# Patient Record
Sex: FEMALE | Race: WHITE
[De-identification: names, ages, dates, MRNs, and addresses within clinical notes are randomized per-mention and may not be internally consistent; named-entity substitution may affect disease eponyms.]

---

## 2017-05-06 ENCOUNTER — HOSPITAL ENCOUNTER (EMERGENCY)
Dept: HOSPITAL 33 - ED | Age: 52
Discharge: HOME | End: 2017-05-06
Payer: COMMERCIAL

## 2017-05-06 VITALS — HEART RATE: 76 BPM | SYSTOLIC BLOOD PRESSURE: 127 MMHG | OXYGEN SATURATION: 100 % | DIASTOLIC BLOOD PRESSURE: 70 MMHG

## 2017-05-06 DIAGNOSIS — R11.0: ICD-10-CM

## 2017-05-06 DIAGNOSIS — R19.7: ICD-10-CM

## 2017-05-06 DIAGNOSIS — K52.9: Primary | ICD-10-CM

## 2017-05-06 DIAGNOSIS — R10.9: ICD-10-CM

## 2017-05-06 LAB
ALBUMIN SERPL-MCNC: 3.8 G/DL (ref 3.4–5)
ALP SERPL-CCNC: 103 U/L (ref 46–116)
ALT SERPL-CCNC: 29 U/L (ref 12–78)
ANION GAP SERPL CALC-SCNC: 15.2 MEQ/L (ref 5–15)
AST SERPL QL: 37 U/L (ref 15–37)
BASOPHILS NFR BLD AUTO: 0.2 % (ref 0–0.4)
BILIRUB BLD-MCNC: 0.6 MG/DL (ref 0.2–1)
BUN SERPL-MCNC: 20 MG/DL (ref 9–20)
CHLORIDE SERPL-SCNC: 101 MEQ/L (ref 98–107)
CO2 SERPL-SCNC: 27.6 MEQ/L (ref 21–32)
COLLECTION TYPE: (no result)
COMPLETE URINE MICROSCOPIC?: YES
GLUCOSE SERPL-MCNC: 97 MG/DL (ref 70–110)
MCH RBC QN AUTO: 29.3 PG (ref 26–32)
NEUTROPHILS NFR BLD AUTO: 73.4 % (ref 36–66)
PLATELET # BLD AUTO: 260 K/MM3 (ref 150–450)
POTASSIUM SERPLBLD-SCNC: 3.8 MEQ/L (ref 3.5–5.1)
PROT SERPL-MCNC: 8.1 GM/DL (ref 6.4–8.2)
RBC # BLD AUTO: 4.75 M/MM3 (ref 4.1–5.4)
SODIUM SERPL-SCNC: 140 MEQ/L (ref 136–145)
WBC # BLD AUTO: 10.9 K/MM3 (ref 4–10.5)
WBC URNS QL MICRO: (no result) /HPF (ref 0–5)

## 2017-05-06 PROCEDURE — 36000 PLACE NEEDLE IN VEIN: CPT

## 2017-05-06 PROCEDURE — 99283 EMERGENCY DEPT VISIT LOW MDM: CPT

## 2017-05-06 PROCEDURE — 96360 HYDRATION IV INFUSION INIT: CPT

## 2017-05-06 PROCEDURE — 83690 ASSAY OF LIPASE: CPT

## 2017-05-06 PROCEDURE — 80053 COMPREHEN METABOLIC PANEL: CPT

## 2017-05-06 PROCEDURE — 99284 EMERGENCY DEPT VISIT MOD MDM: CPT

## 2017-05-06 PROCEDURE — 85025 COMPLETE CBC W/AUTO DIFF WBC: CPT

## 2017-05-06 PROCEDURE — 36415 COLL VENOUS BLD VENIPUNCTURE: CPT

## 2017-05-06 PROCEDURE — 81000 URINALYSIS NONAUTO W/SCOPE: CPT

## 2017-05-06 PROCEDURE — 96374 THER/PROPH/DIAG INJ IV PUSH: CPT

## 2017-05-06 PROCEDURE — 74000: CPT

## 2017-05-06 NOTE — ERPHSYRPT
- History of Present Illness


Time Seen by Provider: 05/06/17 20:40


Historian: patient


Exam Limitations: no limitations


Patient Subjective Stated Complaint: pt states she has been having nausea, 

diarrhea, abd cramping and generalized aches since yesterday at 0300


Triage Nursing Assessment: pt alert and oriented. answers questions approp. 

skin pink warm and dry. respirations nonlabored wilth lungs cta. abd soft and 

nontender to light palpation. bowel sounds present and hyper. pt states she has 

been having approx 1-2 diarrhea stools per hour and states she has been passing 

flatus.


Physician History: 





The patient is a 51-year-old female who is morbidly obese complains of nausea 

and diarrhea since early yesterday morning.  She's had approximately 2-3 loose 

stools yesterday and then again today.  She has taken very little fluids 

because of the nausea.  She did eat a popsicle.  She had some Phenergan from a 

previous illness, took it, and felt better.  That was the only when she had.  

She also aches all over.  Her past medical history significant for hypertension

, high cholesterol, renal failure, and asthma.


Timing/Duration: yesterday


Activities at Onset: none


Quality: cramping


Abdominal Pain Onset Location: generalized abdomen


Pain Radiation: no radiation


Severity of Pain-Max: mild


Severity of Pain-Current: mild


Modifying Factors: Improves With: nothing


Associated Symptoms: diarrhea, nausea


Previous symptoms: no prior history


Allergies/Adverse Reactions: 








Penicillins Allergy (Verified 05/06/17 20:23)


 


sulfamethoxazole [From Bactrim] Allergy (Verified 05/06/17 20:23)


 


trimethoprim [From Bactrim] Allergy (Verified 05/06/17 20:23)


 





Home Medications: 








Aspirin 81 gm Chew*** [Baby Aspirin 81 mg Chew***] 81 mg PO DAILY 11/22/13 [

History]


Clonazepam [Klonopin] 2 mg PO TID PRN 11/22/13 [History]


Hydrocodone Bit/Acetaminophen [Norco 7.5-325 Tablet] 1 tab PO Q6H 07/12/16 [

History]


Chlorthalidone 25 mg PO DAILY 05/06/17 [History]


Levothyroxine Sodium 112 Mcg** [Synthroid 112 Mcg***] 112 mcg PO DAILY 05/06/17 

[History]


Spironolactone 25 mg*** [Aldactone 25 MG***] 25 mg PO DAILY 05/06/17 [History]





Hx Tetanus, Diphtheria Vaccination/Date Given: Yes


Hx Influenza Vaccination/Date Given: No


Hx Pneumococcal Vaccination/Date Given: No


Immunizations Up to Date: Yes





- Review of Systems


Constitutional: No Symptoms (.  Busy schedule)


Eyes: No Symptoms


Ears, Nose, & Throat: No Symptoms


Respiratory: No Cough, No Dyspnea


Cardiac: No Chest Pain, No Edema, No Syncope


Abdominal/Gastrointestinal: Abdominal Pain, Nausea, Diarrhea


Genitourinary Symptoms: No Dysuria


Musculoskeletal: Arthralgias


Skin: No Rash


Neurological: No Dizziness, No Focal Weakness, No Sensory Changes


Psychological: No Symptoms


Endocrine: No Symptoms


Hematologic/Lymphatic: No Symptoms


Immunological/Allergic: No Symptoms


All Other Systems: Reviewed and Negative





- Past Medical History


Pertinent Past Medical History: Yes


Neurological History: No Pertinent History


ENT History: No Pertinent History


Cardiac History: No Pertinent History


Respiratory History: Asthma


Endocrine Medical History: No Pertinent History


Musculoskeletal History: No Pertinent History


GI Medical History: No Pertinent History


 History: Renal Disease


Psycho-Social History: Depression


Female Reproductive Disorders: No Pertinent History


Other Medical History: hx of acute renal failure. pt states function in wnl now





- Past Surgical History


Past Surgical History: Yes


Neuro Surgical History: No Pertinent History


Cardiac: No Pertinent History


Respiratory: No Pertinent History


Gastrointestinal: Appendectomy


Genitourinary: No Pertinent History


Musculoskeletal: Orthopedic Surgery


Female Surgical History: Hysterectomy


Other Surgical History: right knee





- Social History


Smoking Status: Never smoker


Exposure to second hand smoke: No


Drug Use: none


Patient Lives Alone: No





- Nursing Vital Signs


Nursing Vital Signs: 


 Initial Vital Signs











Temperature                    97.8 F


 


Temperature Source             Core


 


Pulse Rate                     70


 


Respiratory Rate               18


 


Blood Pressure [Right Arm]     132/70


 


Pain Intensity                 0

















- Physical Exam


General Appearance: no apparent distress, alert


Eye Exam: PERRL/EOMI, eyes nml inspection


Ears, Nose, Throat Exam: normal ENT inspection, pharynx normal, moist mucous 

membranes


Neck Exam: normal inspection, non-tender, supple, full range of motion


Respiratory Exam: normal breath sounds, lungs clear, No respiratory distress


Cardiovascular Exam: regular rate/rhythm


Gastrointestinal/Abdomen Exam: tenderness


Pelvic Exam: not done


Rectal Exam: not done


Back Exam: normal inspection, normal range of motion, No CVA tenderness, No 

vertebral tenderness


Extremity Exam: normal inspection, normal range of motion, pelvis stable


Neurologic Exam: alert, oriented x 3, cooperative, normal mood/affect, nml 

cerebellar function, sensation nml, No motor deficits


SpO2: 98


Oxygen Delivery: Room Air





- Radiology Exams


  ** Abdomen


X-ray Interpretation: Interpreted by me, Other (increased colonic fecal load)


Ordered Tests: 


 Active Orders 24 hr











 Category Date Time Status


 


 IV Insertion STAT Care  05/06/17 20:55 Active


 


 KUB Stat Exams  05/06/17 20:55 Taken


 


 CBC W DIFF Stat Lab  05/06/17 21:30 Completed


 


 CMP Stat Lab  05/06/17 21:30 Completed


 


 LIPASE Stat Lab  05/06/17 21:43 Completed


 


 UA W/ MICROSCOPIC Stat Lab  05/06/17 21:48 Completed








Medication Summary














Discontinued Medications














Generic Name Dose Route Start Last Admin





  Trade Name Freq  PRN Reason Stop Dose Admin


 


Sodium Chloride  1,000 mls @ 999 mls/hr  05/06/17 20:55  05/06/17 21:02





  Sodium Chloride 0.9% 1000 Ml  IV  05/06/17 21:55  999 mls/hr





  .Q1H1M STA   Administration


 


Sodium Chloride  Confirm  05/06/17 21:01  





  Sodium Chloride 0.9% 1000 Ml  Administered  05/06/17 21:02  





  Dose   





  1,000 mls @ ud   





  .ROUTE   





  .STK-MED ONE   


 


Promethazine HCl  12.5 mg  05/06/17 20:55  05/06/17 21:02





  Phenergan 25 Mg Inj***  IV  05/06/17 20:56  12.5 mg





  STAT ONE   Administration


 


Promethazine HCl  Confirm  05/06/17 21:01  





  Phenergan 25 Mg Inj***  Administered  05/06/17 21:02  





  Dose   





  25 mg   





  .ROUTE   





  .STK-MED ONE   











Lab/Rad Data: 


 Laboratory Result Diagrams





 05/06/17 21:30 





 05/06/17 21:30 





 Laboratory Results











  05/06/17 05/06/17 05/06/17 Range/Units





  21:48 21:43 21:30 


 


WBC     (4.0-10.5)  K/mm3


 


RBC     (4.1-5.4)  M/mm3


 


Hgb     (12.0-16.0)  gm/dl


 


Hct     (35-47)  %


 


MCV     ()  fl


 


MCH     (26-32)  pg


 


MCHC     (32-36)  g/dl


 


RDW     (11.5-14.0)  %


 


Plt Count     (150-450)  K/mm3


 


MPV     (6-9.5)  fl


 


Gran %     (36.0-66.0)  %


 


Lymphocytes %     (24.0-44.0)  %


 


Monocytes %     (0.0-12.0)  %


 


Eosinophils %     (0.00-5.0)  %


 


Basophils %     (0.0-0.4)  %


 


Basophils #     (0-0.4)  


 


Sodium    140  (136-145)  mEq/L


 


Potassium    3.8  (3.5-5.1)  mEq/L


 


Chloride    101  ()  mEq/L


 


Carbon Dioxide    27.6  (21-32)  mEq/L


 


Anion Gap    15.2 H  (5-15)  MEQ/L


 


BUN    20  (9-20)  mg/dL


 


Creatinine    1.53 H  (0.55-1.30)  mg/dl


 


Estimated GFR    38  ML/MIN


 


Glucose    97  ()  MG/DL


 


Calcium    9.4  (8.5-10.1)  mg/dL


 


Total Bilirubin    0.6  (0.2-1.0)  mg/dL


 


AST    37  (15-37)  U/L


 


ALT    29  (12-78)  U/L


 


Alkaline Phosphatase    103  ()  U/L


 


Serum Total Protein    8.1  (6.4-8.2)  gm/dL


 


Albumin    3.8  (3.4-5.0)  g/dL


 


Lipase   224   ()  U/L


 


Ur Collection Type  CLEAN CATCH    


 


Urine Color  YELLOW    (YELLOW)  


 


Urine Appearance  CLEAR    (CLEAR)  


 


Urine pH  7.0    (5-6)  


 


Ur Specific Gravity  1.010    (1.005-1.025)  


 


Urine Protein  NEGATIVE    (Negative)  


 


Urine Glucose (UA)  NEGATIVE    (NEGATIVE)  mg/dL


 


Urine Ketones  NEGATIVE    (NEGATIVE)  


 


Urine Nitrite  NEGATIVE    (NEGATIVE)  


 


Urine Bilirubin  NEGATIVE    (NEGATIVE)  


 


Urine Urobilinogen  0.2    (0-1)  mg/dL


 


Urine WBC (Auto)  TRACE    (NEGATIVE)  


 


Urine RBC (Auto)  NEGATIVE    (0-5)  Dougie/ul


 


Urine Microscopic RBC  2-5    (0-2)  /HPF


 


Urine Microscopic WBC  2-5    (0-5)  /HPF


 


Ur Epithelial Cells  FEW    (FEW)  /HPF


 


Urine Bacteria  FEW    (NEGATIVE)  /HPF


 


Specimen Received  5/6/17 2145    














  05/06/17 Range/Units





  21:30 


 


WBC  10.9 H  (4.0-10.5)  K/mm3


 


RBC  4.75  (4.1-5.4)  M/mm3


 


Hgb  13.9  (12.0-16.0)  gm/dl


 


Hct  43.0  (35-47)  %


 


MCV  90.5  ()  fl


 


MCH  29.3  (26-32)  pg


 


MCHC  32.3  (32-36)  g/dl


 


RDW  14.2 H  (11.5-14.0)  %


 


Plt Count  260  (150-450)  K/mm3


 


MPV  9.2  (6-9.5)  fl


 


Gran %  73.4 H  (36.0-66.0)  %


 


Lymphocytes %  18.4 L  (24.0-44.0)  %


 


Monocytes %  6.5  (0.0-12.0)  %


 


Eosinophils %  1.5  (0.00-5.0)  %


 


Basophils %  0.2  (0.0-0.4)  %


 


Basophils #  0.02  (0-0.4)  


 


Sodium   (136-145)  mEq/L


 


Potassium   (3.5-5.1)  mEq/L


 


Chloride   ()  mEq/L


 


Carbon Dioxide   (21-32)  mEq/L


 


Anion Gap   (5-15)  MEQ/L


 


BUN   (9-20)  mg/dL


 


Creatinine   (0.55-1.30)  mg/dl


 


Estimated GFR   ML/MIN


 


Glucose   ()  MG/DL


 


Calcium   (8.5-10.1)  mg/dL


 


Total Bilirubin   (0.2-1.0)  mg/dL


 


AST   (15-37)  U/L


 


ALT   (12-78)  U/L


 


Alkaline Phosphatase   ()  U/L


 


Serum Total Protein   (6.4-8.2)  gm/dL


 


Albumin   (3.4-5.0)  g/dL


 


Lipase   ()  U/L


 


Ur Collection Type   


 


Urine Color   (YELLOW)  


 


Urine Appearance   (CLEAR)  


 


Urine pH   (5-6)  


 


Ur Specific Gravity   (1.005-1.025)  


 


Urine Protein   (Negative)  


 


Urine Glucose (UA)   (NEGATIVE)  mg/dL


 


Urine Ketones   (NEGATIVE)  


 


Urine Nitrite   (NEGATIVE)  


 


Urine Bilirubin   (NEGATIVE)  


 


Urine Urobilinogen   (0-1)  mg/dL


 


Urine WBC (Auto)   (NEGATIVE)  


 


Urine RBC (Auto)   (0-5)  Dougie/ul


 


Urine Microscopic RBC   (0-2)  /HPF


 


Urine Microscopic WBC   (0-5)  /HPF


 


Ur Epithelial Cells   (FEW)  /HPF


 


Urine Bacteria   (NEGATIVE)  /HPF


 


Specimen Received   














- Progress


Progress: improved


Counseled pt/family regarding: lab results, diagnosis, rad results





- Departure


Time of Disposition: 22:38


Departure Disposition: Home


Clinical Impression: 


 Gastroenteritis





Condition: Stable


Critical Care Time: No


Additional Instructions: 


You have gastroenteritis.  In the ER you were given IV fluids and Phenergan 

12.5 mg by IV.  Begin with a clear liquid diet and advance as tolerated.  You 

were given a prescription for Phenergan 25 mg to be taken every 8 hours as 

needed for nausea.  Follow-up as needed.


Prescriptions: 


Promethazine HCl 25 mg*** [Phenergan 25 mg***] 25 mg PO Q8H PRN PRN #10 tablet


 PRN Reason: Nausea

## 2017-05-07 NOTE — XRAY
Indication: Abdominal pain and diarrhea.



Comparison: December 6, 2007.



KUB again nonacute and nonobstructed with right pelvic phlebolith.  Solid

organs unremarkable.  Osseous structures intact again with mild osteopenia and

degenerative changes.  Lung bases clear.



Impression: Negative KUB.

## 2018-03-29 ENCOUNTER — HOSPITAL ENCOUNTER (EMERGENCY)
Dept: HOSPITAL 33 - ED | Age: 53
Discharge: HOME | End: 2018-03-29
Payer: COMMERCIAL

## 2018-03-29 VITALS — SYSTOLIC BLOOD PRESSURE: 132 MMHG | HEART RATE: 98 BPM | DIASTOLIC BLOOD PRESSURE: 76 MMHG | OXYGEN SATURATION: 99 %

## 2018-03-29 DIAGNOSIS — K62.9: Primary | ICD-10-CM

## 2018-03-29 DIAGNOSIS — E87.6: ICD-10-CM

## 2018-03-29 DIAGNOSIS — E86.0: ICD-10-CM

## 2018-03-29 DIAGNOSIS — E03.9: ICD-10-CM

## 2018-03-29 DIAGNOSIS — F41.9: ICD-10-CM

## 2018-03-29 DIAGNOSIS — I10: ICD-10-CM

## 2018-03-29 DIAGNOSIS — Z79.899: ICD-10-CM

## 2018-03-29 LAB
ALBUMIN SERPL-MCNC: 4.5 G/DL (ref 3.5–5)
ALP SERPL-CCNC: 103 U/L (ref 38–126)
ALT SERPL-CCNC: 28 U/L (ref 0–35)
ANION GAP SERPL CALC-SCNC: 16.9 MEQ/L (ref 5–15)
AST SERPL QL: 32 U/L (ref 14–36)
BASOPHILS # BLD AUTO: 0 10*3/UL (ref 0–0.4)
BASOPHILS NFR BLD AUTO: 0 % (ref 0–0.4)
BILIRUB BLD-MCNC: 0.8 MG/DL (ref 0.2–1.3)
BUN SERPL-MCNC: 34 MG/DL (ref 7–17)
CALCIUM SPEC-MCNC: 9.4 MG/DL (ref 8.4–10.2)
CHLORIDE SERPL-SCNC: 94 MMOL/L (ref 98–107)
CO2 SERPL-SCNC: 34 MMOL/L (ref 22–30)
CREAT SERPL-MCNC: 1.59 MG/DL (ref 0.52–1.04)
EOSINOPHIL # BLD AUTO: 0.06 10*3/UL (ref 0–0.5)
GLUCOSE SERPL-MCNC: 146 MG/DL (ref 74–106)
GLUCOSE UR-MCNC: NEGATIVE MG/DL
GRANULOCYTES # BLD AUTO: 12.69 10*3/UL (ref 1.4–6.9)
HCT VFR BLD AUTO: 46.9 % (ref 35–47)
HGB BLD-MCNC: 15 GM/DL (ref 12–16)
LIPASE SERPL-CCNC: 136 U/L (ref 23–300)
LYMPHOCYTES # SPEC AUTO: 0.8 10*3/UL (ref 1–4.6)
MCH RBC QN AUTO: 29.5 PG (ref 26–32)
MCHC RBC AUTO-ENTMCNC: 32 G/DL (ref 32–36)
MONOCYTES # BLD AUTO: 0.63 10*3/UL (ref 0–1.3)
NEUTROPHILS NFR BLD AUTO: 89.6 % (ref 36–66)
PLATELET # BLD AUTO: 287 K/MM3 (ref 150–450)
POTASSIUM SERPLBLD-SCNC: 3 MMOL/L (ref 3.5–5.1)
PROT SERPL-MCNC: 8.2 G/DL (ref 6.3–8.2)
PROT UR STRIP-MCNC: (no result) MG/DL
RBC # BLD AUTO: 5.08 M/MM3 (ref 4.1–5.4)
SODIUM SERPL-SCNC: 142 MMOL/L (ref 137–145)
WBC # BLD AUTO: 14.2 K/MM3 (ref 4–10.5)

## 2018-03-29 PROCEDURE — 85025 COMPLETE CBC W/AUTO DIFF WBC: CPT

## 2018-03-29 PROCEDURE — 80053 COMPREHEN METABOLIC PANEL: CPT

## 2018-03-29 PROCEDURE — 36000 PLACE NEEDLE IN VEIN: CPT

## 2018-03-29 PROCEDURE — 74176 CT ABD & PELVIS W/O CONTRAST: CPT

## 2018-03-29 PROCEDURE — 96360 HYDRATION IV INFUSION INIT: CPT

## 2018-03-29 PROCEDURE — 96375 TX/PRO/DX INJ NEW DRUG ADDON: CPT

## 2018-03-29 PROCEDURE — 81000 URINALYSIS NONAUTO W/SCOPE: CPT

## 2018-03-29 PROCEDURE — 96374 THER/PROPH/DIAG INJ IV PUSH: CPT

## 2018-03-29 PROCEDURE — 36415 COLL VENOUS BLD VENIPUNCTURE: CPT

## 2018-03-29 PROCEDURE — 87086 URINE CULTURE/COLONY COUNT: CPT

## 2018-03-29 PROCEDURE — 83690 ASSAY OF LIPASE: CPT

## 2018-03-29 PROCEDURE — 74022 RADEX COMPL AQT ABD SERIES: CPT

## 2018-03-29 PROCEDURE — 96361 HYDRATE IV INFUSION ADD-ON: CPT

## 2018-03-29 PROCEDURE — 93041 RHYTHM ECG TRACING: CPT

## 2018-03-29 PROCEDURE — 99284 EMERGENCY DEPT VISIT MOD MDM: CPT

## 2018-03-29 PROCEDURE — 83605 ASSAY OF LACTIC ACID: CPT

## 2018-03-29 NOTE — XRAY
Indication: Abdominal pain and diarrhea.



Multiple contiguous axial images obtained through the abdomen and pelvis

without contrast as ordered.



Comparison: None



Lung bases essentially clear.  Heart is not enlarged.



Stomach is distended with food and 4 medicinal pills.  Noncontrasted bowel

loops appear nonobstructed.  Previous reported appendectomy, cholecystectomy,

and partial hysterectomy.  3.8 cm right adnexal cyst presumed ovary.  Fatty

liver.  No free fluid/air.



Remaining liver, pancreas, spleen, adrenal glands, kidneys, ureters, bladder,

and aorta appear unremarkable for noncontrast exam.



Osseous structures intact with mild degenerative changes throughout the spine.

 Small fatty umbilical hernia.



Impression:

1.  3.8 cm right adnexal cyst resumed ovary in etiology.

2.  Fatty liver and small fatty umbilical hernia.

3.  No acute intra-abdominal/pelvic abnormalities on this noncontrast exam.



CT DI 35.17

## 2018-03-29 NOTE — ERPHSYRPT
- History of Present Illness


Time Seen by Provider: 03/29/18 11:17


Historian: patient


Exam Limitations: no limitations


Patient Subjective Stated Complaint: here for diffuse abd pain that started 

today,with loose stools. no fever or vomiting


Triage Nursing Assessment: pt alert, walked in with cane, resp easy, skin w/d/p

, abd soft. abd soft with bs


Physician History: 





The patient is an obese 52-year-old female who complains of not feeling well on 

Monday night.  She got worse on Tuesday and Wednesday.  She began having upper 

abdominal pain that has progressed to generalized abdominal pain last night.  

Last night she started having copious watery diarrhea.  She's been nauseated 

but has not vomited.  She is lightheaded and dizzy when she stands up.  She is 

achy and weak.  She is chilled.  She took Phenergan tablet 7 hours ago.  Her 

past medical history is significant for hypertension, hypothyroidism, anxiety, 

cholecystectomy, appendectomy, hysterectomy, and tonsillectomy.


Timing/Duration: day(s) (3), gradual onset


Activities at Onset: none


Quality: aching, cramping


Abdominal Pain Onset Location: generalized abdomen


Pain Radiation: no radiation


Severity of Pain-Max: moderate


Severity of Pain-Current: moderate


Modifying Factors: Improves With: nothing


Associated Symptoms: diarrhea, nausea, weakness, No vomiting


Previous symptoms: no prior history


Allergies/Adverse Reactions: 








cephalexin [From Keflex] Allergy (Verified 03/29/18 11:12)


 


Penicillins Allergy (Verified 03/29/18 11:12)


 


sulfamethoxazole [From Bactrim] Allergy (Verified 03/29/18 11:12)


 


trimethoprim [From Bactrim] Allergy (Verified 03/29/18 11:12)


 





Home Medications: 








Aspirin 81 gm Chew*** [Baby Aspirin 81 mg Chew***] 81 mg PO DAILY 11/22/13 [

History]


Clonazepam [Klonopin] 2 mg PO TID PRN 11/22/13 [History]


Hydrocodone Bit/Acetaminophen [Norco 7.5-325 Tablet] 1 tab PO Q6H 07/12/16 [

History]


Chlorthalidone 25 mg PO DAILY 05/06/17 [History]


Levothyroxine Sodium 112 Mcg** [Synthroid 112 Mcg***] 125 mcg PO DAILY 05/06/17 

[History]


Spironolactone 25 mg*** [Aldactone 25 MG***] 25 mg PO DAILY 05/06/17 [History]


Magnesium Oxide [Magnesium Oxide] 400 mg DAILY 03/29/18 [History]


Potassium Chloride [Klor-Con M20] 20 meq DAILY 03/29/18 [History]


Torsemide [Torsemide] 20 mg DAILY 03/29/18 [History]





Hx Tetanus, Diphtheria Vaccination/Date Given: Yes


Hx Influenza Vaccination/Date Given: No


Hx Pneumococcal Vaccination/Date Given: No





- Review of Systems


Constitutional: Chills, Weakness


Eyes: No Symptoms


Ears, Nose, & Throat: No Symptoms


Respiratory: No Cough, No Dyspnea


Cardiac: No Chest Pain, No Edema, No Syncope


Abdominal/Gastrointestinal: Abdominal Pain, Nausea, Diarrhea, No Vomiting


Genitourinary Symptoms: No Dysuria


Musculoskeletal: Myalgias


Skin: No Rash


Neurological: No Dizziness, No Focal Weakness, No Sensory Changes


Psychological: No Symptoms


Endocrine: No Symptoms


Hematologic/Lymphatic: No Symptoms


Immunological/Allergic: No Symptoms


All Other Systems: Reviewed and Negative





- Past Medical History


Pertinent Past Medical History: Yes


Neurological History: No Pertinent History


ENT History: No Pertinent History


Cardiac History: Hypertension


Respiratory History: Asthma


Endocrine Medical History: No Pertinent History


Musculoskeletal History: No Pertinent History


GI Medical History: No Pertinent History


 History: Renal Disease


Psycho-Social History: Depression


Female Reproductive Disorders: No Pertinent History


Other Medical History: hx of acute renal failure. pt states function in wnl now





- Past Surgical History


Past Surgical History: Yes


Neuro Surgical History: No Pertinent History


Cardiac: No Pertinent History


Respiratory: No Pertinent History


Gastrointestinal: Appendectomy, Cholecystectomy


Genitourinary: No Pertinent History


Musculoskeletal: Orthopedic Surgery


Female Surgical History: Hysterectomy


Other Surgical History: right knee





- Social History


Smoking Status: Never smoker


Exposure to second hand smoke: No


Drug Use: none


Patient Lives Alone: No





- Female History


Hx Last Menstrual Period: hyster


Hx Pregnant Now: No





- Nursing Vital Signs


Nursing Vital Signs: 


 Initial Vital Signs











Temperature  98.0 F   03/29/18 10:58


 


Pulse Rate  121 H  03/29/18 10:58


 


Respiratory Rate  18   03/29/18 10:58


 


Blood Pressure  129/70   03/29/18 10:58


 


O2 Sat by Pulse Oximetry  98   03/29/18 10:58








 Pain Scale











Pain Intensity                 4

















- Physical Exam


General Appearance: mild distress


Eye Exam: PERRL/EOMI, eyes nml inspection


Ears, Nose, Throat Exam: dry mucous membranes


Neck Exam: normal inspection, non-tender, supple, full range of motion


Respiratory Exam: normal breath sounds


Cardiovascular Exam: normal heart sounds, tachycardia


Gastrointestinal/Abdomen Exam: tenderness (generalized)


Pelvic Exam: not done


Rectal Exam: not done


Back Exam: normal inspection, normal range of motion, No CVA tenderness, No 

vertebral tenderness


Extremity Exam: normal inspection, normal range of motion, pelvis stable


Neurologic Exam: alert, oriented x 3, cooperative, normal mood/affect, nml 

cerebellar function, sensation nml, No motor deficits


Skin Exam: normal color, warm, dry


**SpO2 Interpretation**: normal


SpO2: 98


Oxygen Delivery: Room Air





- Radiology Exams


  ** Chest


X-ray Interpretation: Reviewed by me, Teleradiologist Report, Negative (per DR Salcedo)





  ** Abdomen


X-ray Interpretation: Reviewed by me, Teleradiologist Report, Negative (Per Dr Salcedo)





- CT Exams


  ** Abdomen/Pelvis


CT Interpretation: Negative (no acute intra-abd or pelvic findings per Dr Salcedo.)

, Tele-radiologist Report


Ordered Tests: 


 Active Orders 24 hr











 Category Date Time Status


 


 IV Insertion STAT Care  03/29/18 11:28 Active


 


 ABDOMEN AND PELVIS W/0 CONTRAS [CT] Stat Exams  03/29/18 12:43 Completed


 


 OBSTR/ACUTE ABDOMEN SERIES Stat Exams  03/29/18 11:47 Completed


 


 CBC W DIFF Stat Lab  03/29/18 11:10 Completed


 


 CMP Stat Lab  03/29/18 11:10 Completed


 


 CULTURE,URINE Stat Lab  03/29/18 11:10 Received


 


 LIPASE Stat Lab  03/29/18 11:10 Completed


 


 Lactic Acid Stat Lab  03/29/18 11:50 Completed


 


 UA W/ MICROSCOPIC Stat Lab  03/29/18 11:10 Completed








Medication Summary











Generic Name Dose Route Start Last Admin





  Trade Name Freq  PRN Reason Stop Dose Admin


 


Potassium Chloride  20 meq in 100 mls @ 50 mls/hr  03/29/18 12:21  03/29/18 12:

54





  Potassium Chloride 20 Meq In Water 100ml  IV  03/29/18 14:20  50 mls/hr





  STAT ONE   Administration














Discontinued Medications














Generic Name Dose Route Start Last Admin





  Trade Name Freq  PRN Reason Stop Dose Admin


 


Acetaminophen  975 mg  03/29/18 11:28  03/29/18 11:53





  Tylenol 325 Mg***  PO  03/29/18 11:29  975 mg





  STAT ONE   Administration


 


Acetaminophen  Confirm  03/29/18 11:35  





  Tylenol 325 Mg***  Administered  03/29/18 11:36  





  Dose   





  975 mg   





  .ROUTE   





  .STK-MED ONE   


 


Famotidine  20 mg  03/29/18 11:28  03/29/18 11:50





  Pepcid 20 Mg Vial***  IV  03/29/18 11:29  20 mg





  STAT ONE   Administration


 


Famotidine  Confirm  03/29/18 11:35  





  Pepcid 20 Mg Vial***  Administered  03/29/18 11:36  





  Dose   





  20 mg   





  IV   





  .STK-MED ONE   


 


Sodium Chloride  1,000 mls @ 999 mls/hr  03/29/18 11:28  03/29/18 11:49





  Sodium Chloride 0.9% 1000 Ml  IV  03/29/18 12:28  999 mls/hr





  .Q1H1M STA   Administration


 


Sodium Chloride  Confirm  03/29/18 11:35  





  Sodium Chloride 0.9% 1000 Ml  Administered  03/29/18 11:36  





  Dose   





  1,000 mls @ ud   





  .ROUTE   





  .STK-MED ONE   


 


Potassium Chloride  Confirm  03/29/18 12:32  





  Potassium Chloride 20 Meq In Water 100ml  Administered  03/29/18 12:33  





  Dose   





  100 mls @ ud   





  IV   





  .STK-MED ONE   


 


Ketorolac Tromethamine  30 mg  03/29/18 11:28  03/29/18 11:51





  Toradol 30 Mg Injection***  IV  03/29/18 11:29  30 mg





  STAT ONE   Administration


 


Ketorolac Tromethamine  Confirm  03/29/18 11:34  





  Toradol 30 Mg Injection***  Administered  03/29/18 11:35  





  Dose   





  30 mg   





  .ROUTE   





  .STK-MED ONE   


 


Ondansetron HCl  4 mg  03/29/18 11:28  03/29/18 11:50





  Zofran 4 Mg/2 Ml Vial**  IV  03/29/18 11:29  4 mg





  STAT ONE   Administration


 


Ondansetron HCl  Confirm  03/29/18 11:34  





  Zofran 4 Mg/2 Ml Vial**  Administered  03/29/18 11:35  





  Dose   





  4 mg   





  .ROUTE   





  .STK-MED ONE   


 


Potassium Chloride  40 meq  03/29/18 12:21  03/29/18 12:36





  Klor Con 10 Meq***  PO  03/29/18 12:22  40 meq





  STAT ONE   Administration


 


Potassium Chloride  Confirm  03/29/18 12:32  





  Klor Con 10 Meq***  Administered  03/29/18 12:33  





  Dose   





  40 meq   





  PO   





  .STK-MED ONE   











Lab/Rad Data: 


 Laboratory Result Diagrams





 03/29/18 11:10 





 03/29/18 11:10 





 Laboratory Results











  03/29/18 03/29/18 03/29/18 Range/Units





  11:50 11:10 11:10 


 


WBC     (4.0-10.5)  K/mm3


 


RBC     (4.1-5.4)  M/mm3


 


Hgb     (12.0-16.0)  gm/dl


 


Hct     (35-47)  %


 


MCV     ()  fl


 


MCH     (26-32)  pg


 


MCHC     (32-36)  g/dl


 


RDW     (11.5-14.0)  %


 


Plt Count     (150-450)  K/mm3


 


MPV     (6-9.5)  fl


 


Gran %     (36.0-66.0)  %


 


Eos # (Auto)     (0-0.5)  


 


Absolute Lymphs (auto)     (1.0-4.6)  


 


Absolute Monos (auto)     (0.0-1.3)  


 


Lymphocytes %     (24.0-44.0)  %


 


Monocytes %     (0.0-12.0)  %


 


Eosinophils %     (0.00-5.0)  %


 


Basophils %     (0.0-0.4)  %


 


Absolute Granulocytes     (1.4-6.9)  


 


Basophils #     (0-0.4)  


 


Sodium    142  (137-145)  mmol/L


 


Potassium    3.0 L  (3.5-5.1)  mmol/L


 


Chloride    94 L  ()  mmol/L


 


Carbon Dioxide    34 H  (22-30)  mmol/L


 


Anion Gap    16.9 H  (5-15)  MEQ/L


 


BUN    34 H  (7-17)  mg/dL


 


Creatinine    1.59 H  (0.52-1.04)  mg/dL


 


Estimated GFR    36  ML/MIN


 


Glucose    146 H  ()  mg/dL


 


Lactic Acid  1.8    (0.4-2.0)  


 


Calcium    9.4  (8.4-10.2)  mg/dL


 


Total Bilirubin    0.80  (0.2-1.3)  mg/dL


 


AST    32  (14-36)  U/L


 


ALT    28  (0-35)  U/L


 


Alkaline Phosphatase    103  ()  U/L


 


Serum Total Protein    8.2  (6.3-8.2)  g/dL


 


Albumin    4.5  (3.5-5.0)  g/dL


 


Lipase    136  ()  U/L


 


Ur Collection Type   VOID   


 


Urine Color   YELLOW   (YELLOW)  


 


Urine Appearance   HAZY   (CLEAR)  


 


Urine pH   8.0   (5-6)  


 


Ur Specific Gravity   1.010   (1.005-1.025)  


 


Urine Protein   TRACE   (Negative)  


 


Urine Ketones   TRACE   (NEGATIVE)  


 


Urine Blood   5-10   (0-5)  Dougie/ul


 


Urine Nitrite   NEGATIVE   (NEGATIVE)  


 


Urine Bilirubin   NEGATIVE   (NEGATIVE)  


 


Urine Urobilinogen   NORMAL   (0-1)  mg/dL


 


Ur Leukocyte Esterase   2+   (NEGATIVE)  


 


Urine Microscopic RBC   2-5   (0-2)  /HPF


 


Urine Microscopic WBC   10-15   (0-5)  /HPF


 


Ur Epithelial Cells   MODERATE   (FEW)  /HPF


 


Urine Bacteria   FEW   (NEGATIVE)  /HPF


 


Urine Culture Reflexed   YES   (NO)  


 


Urine Glucose   NEGATIVE   (NEGATIVE)  mg/dL


 


Specimen Received   3/29/18 1110   














  03/29/18 Range/Units





  11:10 


 


WBC  14.2 H  (4.0-10.5)  K/mm3


 


RBC  5.08  (4.1-5.4)  M/mm3


 


Hgb  15.0  (12.0-16.0)  gm/dl


 


Hct  46.9  (35-47)  %


 


MCV  92.3  ()  fl


 


MCH  29.5  (26-32)  pg


 


MCHC  32.0  (32-36)  g/dl


 


RDW  14.7 H  (11.5-14.0)  %


 


Plt Count  287  (150-450)  K/mm3


 


MPV  8.6  (6-9.5)  fl


 


Gran %  89.6 H  (36.0-66.0)  %


 


Eos # (Auto)  0.06  (0-0.5)  


 


Absolute Lymphs (auto)  0.80 L  (1.0-4.6)  


 


Absolute Monos (auto)  0.63  (0.0-1.3)  


 


Lymphocytes %  5.6 L  (24.0-44.0)  %


 


Monocytes %  4.4  (0.0-12.0)  %


 


Eosinophils %  0.4  (0.00-5.0)  %


 


Basophils %  0.0  (0.0-0.4)  %


 


Absolute Granulocytes  12.69 H  (1.4-6.9)  


 


Basophils #  0  (0-0.4)  


 


Sodium   (137-145)  mmol/L


 


Potassium   (3.5-5.1)  mmol/L


 


Chloride   ()  mmol/L


 


Carbon Dioxide   (22-30)  mmol/L


 


Anion Gap   (5-15)  MEQ/L


 


BUN   (7-17)  mg/dL


 


Creatinine   (0.52-1.04)  mg/dL


 


Estimated GFR   ML/MIN


 


Glucose   ()  mg/dL


 


Lactic Acid   (0.4-2.0)  


 


Calcium   (8.4-10.2)  mg/dL


 


Total Bilirubin   (0.2-1.3)  mg/dL


 


AST   (14-36)  U/L


 


ALT   (0-35)  U/L


 


Alkaline Phosphatase   ()  U/L


 


Serum Total Protein   (6.3-8.2)  g/dL


 


Albumin   (3.5-5.0)  g/dL


 


Lipase   ()  U/L


 


Ur Collection Type   


 


Urine Color   (YELLOW)  


 


Urine Appearance   (CLEAR)  


 


Urine pH   (5-6)  


 


Ur Specific Gravity   (1.005-1.025)  


 


Urine Protein   (Negative)  


 


Urine Ketones   (NEGATIVE)  


 


Urine Blood   (0-5)  Dougie/ul


 


Urine Nitrite   (NEGATIVE)  


 


Urine Bilirubin   (NEGATIVE)  


 


Urine Urobilinogen   (0-1)  mg/dL


 


Ur Leukocyte Esterase   (NEGATIVE)  


 


Urine Microscopic RBC   (0-2)  /HPF


 


Urine Microscopic WBC   (0-5)  /HPF


 


Ur Epithelial Cells   (FEW)  /HPF


 


Urine Bacteria   (NEGATIVE)  /HPF


 


Urine Culture Reflexed   (NO)  


 


Urine Glucose   (NEGATIVE)  mg/dL


 


Specimen Received   














- Progress


Progress: improved


Counseled pt/family regarding: lab results, diagnosis, need for follow-up, rad 

results





- Departure


Time of Disposition: 14:20


Departure Disposition: Home


Clinical Impression: 


 Gastroenteritis, Dehydration, Hypokalemia





Condition: Stable


Critical Care Time: No


Referrals: 


JANNET VELA [Primary Care Provider] - 


Additional Instructions: 


You were dehydrated and had low potassium due to the severe diarrhea.  You were 

given Zofran 4 mg, famotidine 20 mg, Toradol 30 mg, and fluids by IV in the ER.

  You were given Tylenol.  You were also given potassium orally and by IV.  

Take Zofran 4 mg ODT every 6 hours as needed.  Stay well hydrated.  Follow-up 

in one to 2 days with your primary care doctor.


Prescriptions: 


Ondansetron ODT 4 MG*** [Zofran Odt 4 mg] 1 tab PO Q6H PRN PRN #10 tab.rapdis


 PRN Reason: Nausea/Vomiting

## 2018-03-29 NOTE — XRAY
Indication: Abdominal pain and diarrhea.



Comparison: [facility] May 6, 2017.



2 views of the abdomen again nonacute and nonobstructed with interval

cholecystectomy.  Solid organs unremarkable.  Osseous structures intact again

with mild osteopenia and degenerative changes.



Single PA chest demonstrates normal heart and lungs.  Bony thorax intact with

mild osteopenia, degenerative changes, and mild scoliosis.



Impression:

1.  Negative abdomen.

2.  Nonacute 1 view chest.

## 2018-06-18 ENCOUNTER — HOSPITAL ENCOUNTER (OUTPATIENT)
Dept: HOSPITAL 33 - ED | Age: 53
Setting detail: OBSERVATION
LOS: 1 days | Discharge: HOME | End: 2018-06-19
Attending: FAMILY MEDICINE | Admitting: FAMILY MEDICINE
Payer: COMMERCIAL

## 2018-06-18 DIAGNOSIS — I12.9: ICD-10-CM

## 2018-06-18 DIAGNOSIS — N18.9: ICD-10-CM

## 2018-06-18 DIAGNOSIS — E87.6: Primary | ICD-10-CM

## 2018-06-18 DIAGNOSIS — J45.909: ICD-10-CM

## 2018-06-18 LAB
ANION GAP SERPL CALC-SCNC: 13.7 MEQ/L (ref 5–15)
BASOPHILS # BLD AUTO: 0.01 10*3/UL (ref 0–0.4)
BASOPHILS NFR BLD AUTO: 0.1 % (ref 0–0.4)
BUN SERPL-MCNC: 37 MG/DL (ref 7–17)
CALCIUM SPEC-MCNC: 9 MG/DL (ref 8.4–10.2)
CHLORIDE SERPL-SCNC: 91 MMOL/L (ref 98–107)
CO2 SERPL-SCNC: 36 MMOL/L (ref 22–30)
CREAT SERPL-MCNC: 1.65 MG/DL (ref 0.52–1.04)
EOSINOPHIL # BLD AUTO: 0.23 10*3/UL (ref 0–0.5)
GLUCOSE SERPL-MCNC: 284 MG/DL (ref 74–106)
GRANULOCYTES # BLD AUTO: 10.13 10*3/UL (ref 1.4–6.9)
HCT VFR BLD AUTO: 42.9 % (ref 35–47)
HGB BLD-MCNC: 14.2 GM/DL (ref 12–16)
LYMPHOCYTES # SPEC AUTO: 1.6 10*3/UL (ref 1–4.6)
MCH RBC QN AUTO: 30 PG (ref 26–32)
MCHC RBC AUTO-ENTMCNC: 33.1 G/DL (ref 32–36)
MONOCYTES # BLD AUTO: 0.76 10*3/UL (ref 0–1.3)
NEUTROPHILS NFR BLD AUTO: 79.5 % (ref 36–66)
PLATELET # BLD AUTO: 241 K/MM3 (ref 150–450)
POTASSIUM SERPLBLD-SCNC: 2.7 MMOL/L (ref 3.5–5.1)
RBC # BLD AUTO: 4.74 M/MM3 (ref 4.1–5.4)
SODIUM SERPL-SCNC: 138 MMOL/L (ref 137–145)
WBC # BLD AUTO: 12.7 K/MM3 (ref 4–10.5)

## 2018-06-18 PROCEDURE — 85025 COMPLETE CBC W/AUTO DIFF WBC: CPT

## 2018-06-18 PROCEDURE — G0378 HOSPITAL OBSERVATION PER HR: HCPCS

## 2018-06-18 PROCEDURE — 99285 EMERGENCY DEPT VISIT HI MDM: CPT

## 2018-06-18 PROCEDURE — 99284 EMERGENCY DEPT VISIT MOD MDM: CPT

## 2018-06-18 PROCEDURE — 80048 BASIC METABOLIC PNL TOTAL CA: CPT

## 2018-06-18 PROCEDURE — 80053 COMPREHEN METABOLIC PANEL: CPT

## 2018-06-18 PROCEDURE — 93268 ECG RECORD/REVIEW: CPT

## 2018-06-18 PROCEDURE — 84132 ASSAY OF SERUM POTASSIUM: CPT

## 2018-06-18 PROCEDURE — 36415 COLL VENOUS BLD VENIPUNCTURE: CPT

## 2018-06-18 RX ADMIN — POTASSIUM CHLORIDE SCH MLS/HR: 14.9 INJECTION, SOLUTION INTRAVENOUS at 20:26

## 2018-06-18 RX ADMIN — POTASSIUM CHLORIDE SCH MLS/HR: 14.9 INJECTION, SOLUTION INTRAVENOUS at 18:30

## 2018-06-18 NOTE — ERPHSYRPT
- History of Present Illness


Time Seen by Provider: 06/18/18 12:14


Source: patient


Exam Limitations: no limitations


Patient Subjective Stated Complaint: states dr morris office called nad told 

her she needed to go to ER her potassium was low


Triage Nursing Assessment: pt alert and oriented x3, gait is steady, ambulates 

well, skin WDI, lung sounds celar diminished, bowel sounds present x4.


Physician History: 





The patient is a 52-year-old female complaining that she received a phone call 

from her cardiologist's office, Dr. Hopkins, after having a blood test done this 

morning.  The call instructed the patient to go to the ER immediately because 

her potassium level was 2.9.  The patient states she feels fine.  She denies 

chest pain or shortness of breath.  Her past medical history is significant for 

CHF, hypertension, and hypothyroidism.


Timing/Duration: today, sudden


Severity: mild


Associated Symptoms: denies symptoms


Allergies/Adverse Reactions: 








cephalexin [From Keflex] Allergy (Verified 03/29/18 11:12)


 


Penicillins Allergy (Verified 03/29/18 11:12)


 


sulfamethoxazole [From Bactrim] Allergy (Verified 03/29/18 11:12)


 


trimethoprim [From Bactrim] Allergy (Verified 03/29/18 11:12)


 





Home Medications: 








Aspirin 81 gm Chew*** [Baby Aspirin 81 mg Chew***] 81 mg PO DAILY 11/22/13 [

History]


clonazePAM [Klonopin] 2 mg PO TID PRN 11/22/13 [History]


Hydrocodone Bit/Acetaminophen [Norco 7.5-325 Tablet] 1 tab PO Q6H 07/12/16 [

History]


Chlorthalidone 25 mg PO DAILY 05/06/17 [History]


Levothyroxine Sodium 112 Mcg** [Synthroid 112 Mcg***] 125 mcg PO DAILY 05/06/17 

[History]


Spironolactone 25 mg*** [Aldactone 25 MG***] 25 mg PO DAILY 05/06/17 [History]


Magnesium Oxide [Magnesium Oxide] 400 mg DAILY 03/29/18 [History]


Potassium Chloride [Klor-Con M20] 20 meq DAILY 03/29/18 [History]


Torsemide [Torsemide] 20 mg DAILY 03/29/18 [History]





Hx Tetanus, Diphtheria Vaccination/Date Given: Yes


Hx Influenza Vaccination/Date Given: Yes


Hx Pneumococcal Vaccination/Date Given: Yes


Immunizations Up to Date: Yes





- Review of Systems


Constitutional: No Fever, No Chills


Eyes: No Symptoms


Ears, Nose, & Throat: No Symptoms


Respiratory: No Cough, No Dyspnea


Cardiac: No Chest Pain, No Edema, No Syncope


Abdominal/Gastrointestinal: No Abdominal Pain, No Nausea, No Vomiting, No 

Diarrhea


Genitourinary Symptoms: No Dysuria


Musculoskeletal: No Back Pain, No Neck Pain


Skin: No Rash


Neurological: No Dizziness, No Focal Weakness, No Sensory Changes


Psychological: No Symptoms


Endocrine: No Symptoms


All Other Systems: Reviewed and Negative





- Past Medical History


Pertinent Past Medical History: Yes


Neurological History: No Pertinent History


ENT History: No Pertinent History


Cardiac History: Hypertension


Respiratory History: Asthma


Endocrine Medical History: No Pertinent History


Musculoskeletal History: No Pertinent History


GI Medical History: No Pertinent History


 History: Renal Disease


Psycho-Social History: Depression


Female Reproductive Disorders: No Pertinent History


Other Medical History: hx of acute renal failure. pt states function in wnl now





- Past Surgical History


Past Surgical History: Yes


Neuro Surgical History: No Pertinent History


Cardiac: No Pertinent History


Respiratory: No Pertinent History


Gastrointestinal: Appendectomy, Cholecystectomy


Genitourinary: No Pertinent History


Musculoskeletal: Orthopedic Surgery


Female Surgical History: Hysterectomy


Other Surgical History: right knee





- Social History


Smoking Status: Never smoker


Exposure to second hand smoke: No


Drug Use: none


Patient Lives Alone: No





- Female History


Hx Pregnant Now: No





- Nursing Vital Signs


Nursing Vital Signs: 


 Initial Vital Signs











Temperature  98.2 F   06/18/18 11:38


 


Pulse Rate  83   06/18/18 11:38


 


Respiratory Rate  20   06/18/18 11:38


 


Blood Pressure  147/75   06/18/18 11:38


 


O2 Sat by Pulse Oximetry  95   06/18/18 11:38








 Pain Scale











Pain Intensity                 0

















- Physical Exam


General Appearance: no apparent distress, alert


Eye Exam: PERRL/EOMI, eyes nml inspection


Ears, Nose, Throat Exam: normal ENT inspection, TMs normal, pharynx normal, 

moist mucous membranes


Neck Exam: normal inspection, non-tender, supple, full range of motion


Respiratory Exam: normal breath sounds, lungs clear, No respiratory distress


Cardiovascular Exam: regular rate/rhythm, normal heart sounds, normal 

peripheral pulses


Gastrointestinal/Abdomen Exam: soft, normal bowel sounds, No tenderness, No mass


Back Exam: normal inspection, normal range of motion, No CVA tenderness, No 

vertebral tenderness


Extremity Exam: normal inspection, normal range of motion, pelvis stable


Neurologic Exam: alert, oriented x 3, cooperative, normal mood/affect, nml 

cerebellar function, nml station & gait, sensation nml, No motor deficits


Skin Exam: normal color, warm, dry, No rash


Lymphatic Exam: No adenopathy


SpO2: 95


Oxygen Delivery: Room Air


Ordered Tests: 


 Active Orders 24 hr











 Category Date Time Status


 


 BMP Stat Lab  06/18/18 12:22 Completed


 


 CBC W DIFF Stat Lab  06/18/18 12:22 Completed








Medication Summary














Discontinued Medications














Generic Name Dose Route Start Last Admin





  Trade Name Freq  PRN Reason Stop Dose Admin


 


Potassium Chloride  40 meq  06/18/18 13:56  06/18/18 14:46





  Klor Con 10 Meq***  PO  06/18/18 13:57  40 meq





  STAT ONE   Administration


 


Potassium Chloride  Confirm  06/18/18 14:45  





  Klor Con 10 Meq***  Administered  06/18/18 14:46  





  Dose   





  40 meq   





  PO   





  .STTBi Connect-MED ONE   











Lab/Rad Data: 


 Laboratory Result Diagrams





 06/18/18 12:22 





 06/18/18 12:22 





 Laboratory Results











  06/18/18 06/18/18 Range/Units





  12:22 12:22 


 


WBC   12.7 H  (4.0-10.5)  K/mm3


 


RBC   4.74  (4.1-5.4)  M/mm3


 


Hgb   14.2  (12.0-16.0)  gm/dl


 


Hct   42.9  (35-47)  %


 


MCV   90.5  ()  fl


 


MCH   30.0  (26-32)  pg


 


MCHC   33.1  (32-36)  g/dl


 


RDW   14.9 H  (11.5-14.0)  %


 


Plt Count   241  (150-450)  K/mm3


 


MPV   8.6  (6-9.5)  fl


 


Gran %   79.5 H  (36.0-66.0)  %


 


Eos # (Auto)   0.23  (0-0.5)  


 


Absolute Lymphs (auto)   1.60  (1.0-4.6)  


 


Absolute Monos (auto)   0.76  (0.0-1.3)  


 


Lymphocytes %   12.6 L  (24.0-44.0)  %


 


Monocytes %   6.0  (0.0-12.0)  %


 


Eosinophils %   1.8  (0.00-5.0)  %


 


Basophils %   0.1  (0.0-0.4)  %


 


Absolute Granulocytes   10.13 H  (1.4-6.9)  


 


Basophils #   0.01  (0-0.4)  


 


Sodium  138   (137-145)  mmol/L


 


Potassium  2.7 L*   (3.5-5.1)  mmol/L


 


Chloride  91 L   ()  mmol/L


 


Carbon Dioxide  36 H   (22-30)  mmol/L


 


Anion Gap  13.7   (5-15)  MEQ/L


 


BUN  37 H   (7-17)  mg/dL


 


Creatinine  1.65 H   (0.52-1.04)  mg/dL


 


Estimated GFR  34.5   ML/MIN


 


Glucose  284 H   ()  mg/dL


 


Calcium  9   (8.4-10.2)  mg/dL














- Progress


Progress: improved


Discussed with : Larry (for FLAQUITA Vela)


Will see patient in: hospital (observation)


Counseled pt/family regarding: lab results, diagnosis





- Departure


Time of Disposition: 16:30


Departure Disposition: Observation (per Dr Juarez for Dr RICH Shabazz)


Clinical Impression: 


 Hypokalemia





Condition: Stable


Critical Care Time: No


Referrals: 


JANNET VELA [Primary Care Provider] -

## 2018-06-19 VITALS — SYSTOLIC BLOOD PRESSURE: 148 MMHG | DIASTOLIC BLOOD PRESSURE: 67 MMHG | OXYGEN SATURATION: 97 % | HEART RATE: 84 BPM

## 2018-06-19 LAB
ANION GAP SERPL CALC-SCNC: 11.1 MEQ/L (ref 5–15)
BASOPHILS # BLD AUTO: 0.02 10*3/UL (ref 0–0.4)
BASOPHILS NFR BLD AUTO: 0.2 % (ref 0–0.4)
BUN SERPL-MCNC: 37 MG/DL (ref 7–17)
CALCIUM SPEC-MCNC: 9.1 MG/DL (ref 8.4–10.2)
CHLORIDE SERPL-SCNC: 96 MMOL/L (ref 98–107)
CO2 SERPL-SCNC: 34 MMOL/L (ref 22–30)
CREAT SERPL-MCNC: 1.39 MG/DL (ref 0.52–1.04)
EOSINOPHIL # BLD AUTO: 0.27 10*3/UL (ref 0–0.5)
GLUCOSE SERPL-MCNC: 140 MG/DL (ref 74–106)
GRANULOCYTES # BLD AUTO: 7.57 10*3/UL (ref 1.4–6.9)
HCT VFR BLD AUTO: 40.9 % (ref 35–47)
HGB BLD-MCNC: 13.4 GM/DL (ref 12–16)
LYMPHOCYTES # SPEC AUTO: 2.4 10*3/UL (ref 1–4.6)
MCH RBC QN AUTO: 29.9 PG (ref 26–32)
MCHC RBC AUTO-ENTMCNC: 32.8 G/DL (ref 32–36)
MONOCYTES # BLD AUTO: 0.81 10*3/UL (ref 0–1.3)
NEUTROPHILS NFR BLD AUTO: 68.4 % (ref 36–66)
PLATELET # BLD AUTO: 217 K/MM3 (ref 150–450)
POTASSIUM SERPLBLD-SCNC: 3.1 MMOL/L (ref 3.5–5.1)
RBC # BLD AUTO: 4.48 M/MM3 (ref 4.1–5.4)
SODIUM SERPL-SCNC: 138 MMOL/L (ref 137–145)
WBC # BLD AUTO: 11.1 K/MM3 (ref 4–10.5)

## 2018-06-19 RX ADMIN — POTASSIUM CHLORIDE SCH MLS/HR: 14.9 INJECTION, SOLUTION INTRAVENOUS at 10:56

## 2018-06-19 RX ADMIN — POTASSIUM CHLORIDE SCH MLS/HR: 14.9 INJECTION, SOLUTION INTRAVENOUS at 08:46

## 2018-06-19 NOTE — PCM.HP
History of Present Illness





- Chief Complaint


Chief Complaint: HYPOKALEMIA


Date: 06/19/18


History of Present Illness: 


 is a 53 year old female.


she was found to have low potassium by her nephrologist and is asymptomatic and 

was told to come to ED.


She was given potassium rider and po potassium and remains low this am and she 

remains asymptomatic


She has had no swelling recently and feels her diuretics are too strong 

currently. 





- Review of Systems


Constitutional: No Fever, No Chills


Eyes: No Symptoms


Ears, Nose, & Throat: No Symptoms


Respiratory: No Cough, No Short Of Breath


Cardiac: No Chest Pain, No Edema, No Syncope


Abdominal/Gastrointestinal: No Abdominal Pain, No Nausea, No Vomiting, No 

Diarrhea


Genitourinary Symptoms: No Dysuria


Musculoskeletal: No Back Pain, No Neck Pain


Skin: Rash (psoriasis)


Neurological: No Dizziness, No Focal Weakness, No Sensory Changes


Psychological: No Symptoms


Endocrine: No Symptoms


Hematologic/Lymphatic: No Symptoms


Immunological/Allergic: No Symptoms





Medications & Allergies


Home Medications: 


 Home Medication List





Aspirin 81 gm Chew*** [Baby Aspirin 81 mg Chew***] 81 mg PO DAILY 11/22/13 [

History Confirmed 06/18/18]


clonazePAM [Klonopin] 2 mg PO TID PRN 11/22/13 [History Confirmed 06/18/18]


Hydrocodone Bit/Acetaminophen [Norco 7.5-325 Tablet] 1 tab PO Q6HPRN PRN 07/12/ 16 [History Confirmed 06/18/18]


Chlorthalidone 25 mg PO DAILY 05/06/17 [History Confirmed 06/18/18]


Promethazine HCl 25 mg*** [Phenergan 25 mg***] 25 mg PO Q8H PRN PRN #10 tablet 

05/06/17 [Rx Confirmed 06/18/18]


Spironolactone 25 mg*** [Aldactone 25 MG***] 25 mg PO DAILY 05/06/17 [History 

Confirmed 06/18/18]


Magnesium Oxide 400 mg PO DAILY 03/29/18 [History Confirmed 06/18/18]


Ergocalciferol (Vitamin D2) [Vitamin D2] 50,000 unit PO Q7D 06/18/18 [History 

Confirmed 06/18/18]


Levothyroxine Sodium [Synthroid] 125 mcg PO DAILY 06/18/18 [History Confirmed 06 /18/18]


Metoprolol Succinate 100 mg PO DAILY 06/18/18 [History Confirmed 06/18/18]


Potassium Chloride 10 Meq Tab* [Klor Con 10 MEQ***] 30 meq PO BID 06/18/18 [

History Confirmed 06/18/18]








Allergies/Adverse Reactions: 


 Allergies











Allergy/AdvReac Type Severity Reaction Status Date / Time


 


cephalexin [From Keflex] Allergy   Verified 06/18/18 17:39


 


Penicillins Allergy   Verified 06/18/18 17:39


 


sulfamethoxazole Allergy   Verified 06/18/18 17:39





[From Bactrim]     


 


trimethoprim [From Bactrim] Allergy   Verified 06/18/18 17:39














- Past Medical History


Past Medical History: Yes


Neurological History: No Pertinent History


ENT History: No Pertinent History


Cardiac History: Hypertension


Respiratory History: Asthma


Endocrine Medical History: No Pertinent History


Musculoskelatal History: No Pertinent History


GI Medical History: No Pertinent History


 History: Renal Disease


Pyscho-Social History: No Pertinent History


Reproductive Disorders: No Pertinent History


Comment: hx of acute renal failure. pt states function in wnl now





- Female History


Are you pregnant now?: No





- Past Surgical History


Past Surgical History: Yes


Neuro Surgical History: No Pertinent History


Cardiac History: No Pertinent History


Respiratory Surgery: No Pertinent History


GI Surgical History: Appendectomy, Cholecystectomy


Genitourinary Surgical Hx: No Pertinent History


Musculskeletal Surgical Hx: Orthopedic Surgery


Female Surgical History: Hysterectomy


Other Surgical History: right knee





- Social History


Smoking Status: Never smoker


Exposure to second hand smoke: No


Alcohol: None


Drug Use: none





- Physical Exam


Vital Signs: 


 Vital Signs - 24 hr











  Temp Pulse Resp BP Pulse Ox


 


 06/19/18 07:23  96.8 F  72  20  121/67  96


 


 06/19/18 04:00  97.7 F  71  20  108/61  94 L


 


 06/19/18 00:00  98.3 F  72  16  114/55  94 L


 


 06/18/18 20:00  98.1 F  86  16  140/63  96


 


 06/18/18 17:28  98.8 F  79  18  127/64  93 L


 


 06/18/18 17:20  98.8 F  79   127/64 


 


 06/18/18 16:36      95


 


 06/18/18 16:35   78  22  111/66  95


 


 06/18/18 15:02   78  18  127/75  95


 


 06/18/18 14:18   84  16   95


 


 06/18/18 11:38  98.2 F  83  20  147/75  95











General Appearance: no apparent distress, alert, obese


Neurologic Exam: alert, oriented x 3, cooperative, normal mood/affect, nml 

cerebellar function, nml station & gait, sensation nml, No motor deficits


Eye Exam: PERRL/EOMI, eyes nml inspection


Ears, Nose, Throat Exam: normal ENT inspection, TMs normal, pharynx normal, 

moist mucous membranes


Neck Exam: normal inspection, non-tender, supple, full range of motion


Respiratory Exam: normal breath sounds, lungs clear, No respiratory distress


Cardiovascular Exam: regular rate/rhythm, normal heart sounds, normal 

peripheral pulses


Gastrointestinal/Abdomen Exam: soft, normal bowel sounds, No tenderness, No mass


Back Exam: normal inspection, normal range of motion, No CVA tenderness, No 

vertebral tenderness


Extremity Exam: normal inspection, normal range of motion, pelvis stable


Skin Exam: normal color, warm, dry, rash (psoriateic scalp neck extensor 

surfaces arms)


Lymphatic Exam: No adenopathy





Results





- Labs


Lab/Micro Results: 


 Lab Results-Last 24 Hours











  06/18/18 06/19/18 06/19/18 Range/Units





  17:35 02:29 02:29 


 


WBC   11.1 H   (4.0-10.5)  K/mm3


 


RBC   4.48   (4.1-5.4)  M/mm3


 


Hgb   13.4   (12.0-16.0)  gm/dl


 


Hct   40.9   (35-47)  %


 


MCV   91.3   ()  fl


 


MCH   29.9   (26-32)  pg


 


MCHC   32.8   (32-36)  g/dl


 


RDW   14.9 H   (11.5-14.0)  %


 


Plt Count   217   (150-450)  K/mm3


 


MPV   8.1   (6-9.5)  fl


 


Gran %   68.4 H   (36.0-66.0)  %


 


Eos # (Auto)   0.27   (0-0.5)  


 


Absolute Lymphs (auto)   2.40   (1.0-4.6)  


 


Absolute Monos (auto)   0.81   (0.0-1.3)  


 


Lymphocytes %   21.7 L   (24.0-44.0)  %


 


Monocytes %   7.3   (0.0-12.0)  %


 


Eosinophils %   2.4   (0.00-5.0)  %


 


Basophils %   0.2   (0.0-0.4)  %


 


Absolute Granulocytes   7.57 H   (1.4-6.9)  


 


Basophils #   0.02   (0-0.4)  


 


Sodium    138  (137-145)  mmol/L


 


Potassium  2.9 L*   3.1 L  (3.5-5.1)  mmol/L


 


Chloride    96 L  ()  mmol/L


 


Carbon Dioxide    34 H  (22-30)  mmol/L


 


Anion Gap    11.1  (5-15)  MEQ/L


 


BUN    37 H  (7-17)  mg/dL


 


Creatinine    1.39 H  (0.52-1.04)  mg/dL


 


Estimated GFR    41.8  ML/MIN


 


Glucose    140 H  ()  mg/dL


 


Calcium    9.1  (8.4-10.2)  mg/dL














  06/19/18 Range/Units





  02:29 


 


WBC   (4.0-10.5)  K/mm3


 


RBC   (4.1-5.4)  M/mm3


 


Hgb   (12.0-16.0)  gm/dl


 


Hct   (35-47)  %


 


MCV   ()  fl


 


MCH   (26-32)  pg


 


MCHC   (32-36)  g/dl


 


RDW   (11.5-14.0)  %


 


Plt Count   (150-450)  K/mm3


 


MPV   (6-9.5)  fl


 


Gran %   (36.0-66.0)  %


 


Eos # (Auto)   (0-0.5)  


 


Absolute Lymphs (auto)   (1.0-4.6)  


 


Absolute Monos (auto)   (0.0-1.3)  


 


Lymphocytes %   (24.0-44.0)  %


 


Monocytes %   (0.0-12.0)  %


 


Eosinophils %   (0.00-5.0)  %


 


Basophils %   (0.0-0.4)  %


 


Absolute Granulocytes   (1.4-6.9)  


 


Basophils #   (0-0.4)  


 


Sodium   (137-145)  mmol/L


 


Potassium  3.1 L  (3.5-5.1)  mmol/L


 


Chloride   ()  mmol/L


 


Carbon Dioxide   (22-30)  mmol/L


 


Anion Gap   (5-15)  MEQ/L


 


BUN   (7-17)  mg/dL


 


Creatinine   (0.52-1.04)  mg/dL


 


Estimated GFR   ML/MIN


 


Glucose   ()  mg/dL


 


Calcium   (8.4-10.2)  mg/dL














Assessment/Plan


(1) Hypokalemia


Status: Acute   Onset Date: ~06/19/18   


Assessment & Plan: 


magnesium recently checked and 2.1


K has been chronically low and worsening


will give additional 40 iv


hold the chlorthalidone and torsemide and continue the aldactone


will increase K at discharge and hold the torsemide and f/u closely in the 

office for swelling 


repeat labs 2 days after discharge. 


Code(s): E87.6 - HYPOKALEMIA   





(2) CKD (chronic kidney disease)


Status: Acute   Code(s): N18.9 - CHRONIC KIDNEY DISEASE, UNSPECIFIED   





(3) HTN (hypertension)


Status: Acute   Code(s): I10 - ESSENTIAL (PRIMARY) HYPERTENSION

## 2018-06-19 NOTE — PCM.DCORD
- Discharge


Discharge Date: 06/19/18


Disposition: Home, Self-Care


Condition: Stable


Prescriptions: 


Continue


   clonazePAM [Klonopin] 2 mg PO TID PRN


   Aspirin 81 gm Chew*** [Baby Aspirin 81 mg Chew***] 81 mg PO DAILY


   Hydrocodone Bit/Acetaminophen [Norco 7.5-325 Tablet] 1 tab PO Q6HPRN PRN


     PRN Reason: Pain


   Chlorthalidone 25 mg PO DAILY


   Spironolactone 25 mg*** [Aldactone 25 MG***] 25 mg PO DAILY


   Promethazine HCl 25 mg*** [Phenergan 25 mg***] 25 mg PO Q8H PRN PRN #10 

tablet


     PRN Reason: Nausea


   Magnesium Oxide 400 mg PO DAILY


   Ergocalciferol (Vitamin D2) [Vitamin D2] 50,000 unit PO Q7D


   Levothyroxine Sodium [Synthroid] 125 mcg PO DAILY


   Potassium Chloride 10 Meq Tab* [Klor Con 10 MEQ***] 30 meq PO BID


   Metoprolol Succinate 100 mg PO DAILY





Discontinued


   Torsemide [Torsemide] 20 mg PO DAILY


Follow up with: 


JANNET VELA [Primary Care Provider] - 1 Week

## 2019-07-06 ENCOUNTER — HOSPITAL ENCOUNTER (OUTPATIENT)
Dept: HOSPITAL 33 - ED | Age: 54
Setting detail: OBSERVATION
LOS: 1 days | Discharge: HOME | End: 2019-07-07
Attending: FAMILY MEDICINE | Admitting: INTERNAL MEDICINE
Payer: MEDICAID

## 2019-07-06 DIAGNOSIS — Z79.899: ICD-10-CM
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
## 2019-07-06 DIAGNOSIS — L03.116: Primary | ICD-10-CM

## 2019-07-06 DIAGNOSIS — I10: ICD-10-CM

## 2019-07-06 LAB
ALBUMIN SERPL-MCNC: 4.2 G/DL (ref 3.5–5)
ALP SERPL-CCNC: 116 U/L (ref 38–126)
ALT SERPL-CCNC: 27 U/L (ref 0–35)
AMYLASE SERPL-CCNC: 75 U/L (ref 30–110)
ANION GAP SERPL CALC-SCNC: 13.7 MEQ/L (ref 5–15)
AST SERPL QL: 26 U/L (ref 14–36)
BASOPHILS # BLD AUTO: 0.02 10*3/UL (ref 0–0.4)
BASOPHILS NFR BLD AUTO: 0.1 % (ref 0–0.4)
BILIRUB BLD-MCNC: 0.7 MG/DL (ref 0.2–1.3)
BUN SERPL-MCNC: 20 MG/DL (ref 7–17)
CALCIUM SPEC-MCNC: 9.4 MG/DL (ref 8.4–10.2)
CHLORIDE SERPL-SCNC: 99 MMOL/L (ref 98–107)
CO2 SERPL-SCNC: 32 MMOL/L (ref 22–30)
CREAT SERPL-MCNC: 1.52 MG/DL (ref 0.52–1.04)
EOSINOPHIL # BLD AUTO: 0.05 10*3/UL (ref 0–0.5)
GLUCOSE SERPL-MCNC: 137 MG/DL (ref 74–106)
GLUCOSE UR-MCNC: NEGATIVE MG/DL
GRANULOCYTES # BLD AUTO: 19.36 10*3/UL (ref 1.4–6.9)
HCT VFR BLD AUTO: 44.8 % (ref 35–47)
HGB BLD-MCNC: 14.2 GM/DL (ref 12–16)
INR PPP: 1.07 (ref 0.8–3)
LYMPHOCYTES # SPEC AUTO: 0.89 10*3/UL (ref 1–4.6)
MCH RBC QN AUTO: 30.5 PG (ref 26–32)
MCHC RBC AUTO-ENTMCNC: 31.7 G/DL (ref 32–36)
MONOCYTES # BLD AUTO: 0.77 10*3/UL (ref 0–1.3)
NEUTROPHILS NFR BLD AUTO: 91.8 % (ref 36–66)
PLATELET # BLD AUTO: 207 K/MM3 (ref 150–450)
POTASSIUM SERPLBLD-SCNC: 3.7 MMOL/L (ref 3.5–5.1)
PROT SERPL-MCNC: 7.5 G/DL (ref 6.3–8.2)
PROT UR STRIP-MCNC: NEGATIVE MG/DL
PROTHROMBIN TIME: 12.1 SECONDS (ref 9.95–12.35)
RBC # BLD AUTO: 4.66 M/MM3 (ref 4.1–5.4)
RBC #/AREA URNS HPF: (no result) /HPF (ref 0–2)
SODIUM SERPL-SCNC: 141 MMOL/L (ref 137–145)
WBC # BLD AUTO: 21.1 K/MM3 (ref 4–10.5)
WBC #/AREA URNS HPF: (no result) /HPF (ref 0–5)

## 2019-07-06 PROCEDURE — 94760 N-INVAS EAR/PLS OXIMETRY 1: CPT

## 2019-07-06 PROCEDURE — 83605 ASSAY OF LACTIC ACID: CPT

## 2019-07-06 PROCEDURE — 93971 EXTREMITY STUDY: CPT

## 2019-07-06 PROCEDURE — 99285 EMERGENCY DEPT VISIT HI MDM: CPT

## 2019-07-06 PROCEDURE — 80053 COMPREHEN METABOLIC PANEL: CPT

## 2019-07-06 PROCEDURE — 87186 SC STD MICRODIL/AGAR DIL: CPT

## 2019-07-06 PROCEDURE — 87070 CULTURE OTHR SPECIMN AEROBIC: CPT

## 2019-07-06 PROCEDURE — 85610 PROTHROMBIN TIME: CPT

## 2019-07-06 PROCEDURE — 99284 EMERGENCY DEPT VISIT MOD MDM: CPT

## 2019-07-06 PROCEDURE — 83036 HEMOGLOBIN GLYCOSYLATED A1C: CPT

## 2019-07-06 PROCEDURE — G0378 HOSPITAL OBSERVATION PER HR: HCPCS

## 2019-07-06 PROCEDURE — 80048 BASIC METABOLIC PNL TOTAL CA: CPT

## 2019-07-06 PROCEDURE — 85027 COMPLETE CBC AUTOMATED: CPT

## 2019-07-06 PROCEDURE — 83690 ASSAY OF LIPASE: CPT

## 2019-07-06 PROCEDURE — 71045 X-RAY EXAM CHEST 1 VIEW: CPT

## 2019-07-06 PROCEDURE — 36415 COLL VENOUS BLD VENIPUNCTURE: CPT

## 2019-07-06 PROCEDURE — 87075 CULTR BACTERIA EXCEPT BLOOD: CPT

## 2019-07-06 PROCEDURE — 96365 THER/PROPH/DIAG IV INF INIT: CPT

## 2019-07-06 PROCEDURE — 96374 THER/PROPH/DIAG INJ IV PUSH: CPT

## 2019-07-06 PROCEDURE — 87077 CULTURE AEROBIC IDENTIFY: CPT

## 2019-07-06 PROCEDURE — 82150 ASSAY OF AMYLASE: CPT

## 2019-07-06 PROCEDURE — 85379 FIBRIN DEGRADATION QUANT: CPT

## 2019-07-06 PROCEDURE — 96372 THER/PROPH/DIAG INJ SC/IM: CPT

## 2019-07-06 PROCEDURE — 84484 ASSAY OF TROPONIN QUANT: CPT

## 2019-07-06 PROCEDURE — 87040 BLOOD CULTURE FOR BACTERIA: CPT

## 2019-07-06 PROCEDURE — 82962 GLUCOSE BLOOD TEST: CPT

## 2019-07-06 PROCEDURE — 81001 URINALYSIS AUTO W/SCOPE: CPT

## 2019-07-06 PROCEDURE — 85025 COMPLETE CBC W/AUTO DIFF WBC: CPT

## 2019-07-06 RX ADMIN — HYDROCODONE BITARTRATE AND ACETAMINOPHEN PRN TAB: 7.5; 325 TABLET ORAL at 15:38

## 2019-07-06 RX ADMIN — POTASSIUM CHLORIDE AND SODIUM CHLORIDE SCH MLS/HR: 900; 150 INJECTION, SOLUTION INTRAVENOUS at 17:21

## 2019-07-06 RX ADMIN — ACETAMINOPHEN PRN MG: 325 TABLET ORAL at 20:05

## 2019-07-06 RX ADMIN — HYDROCODONE BITARTRATE AND ACETAMINOPHEN PRN TAB: 7.5; 325 TABLET ORAL at 21:37

## 2019-07-06 RX ADMIN — POTASSIUM CHLORIDE SCH MEQ: 10 TABLET, EXTENDED RELEASE ORAL at 21:38

## 2019-07-06 NOTE — XRAY
Indication: Cough.



Comparison: March 29, 2018.



Portable chest less inflated and remains clear.  Heart and mediastinal

structures within normal limits.  Bony thorax intact again with mild

degenerative changes.



Impression: Stable nonacute chest.

## 2019-07-06 NOTE — ERPHSYRPT
- History of Present Illness


Time Seen by Provider: 07/06/19 11:57


Source: patient


Exam Limitations: clinical condition


Patient Subjective Stated Complaint: "my dog was playing and bit my leg last 

week and the pain in my leg worse today"


Triage Nursing Assessment: AAox3, color fair, small abrasion to left anterior 

lower leg noted. no puncture wound or bleeding noted.  Pt c/o feeling very cold 

today and states leg hurts.  States h/o of stage 3 renal disease.  walked in 

with cane.  Edema to lower legs noted. states takes water pill of swelling in 

legs.


Physician History: 





PATIENT WITH A HISTORY OF STAGE 3 RENAL DISEASE, HYPERTENSION, AND 

HYPOTHYROIDISM, WAS BITTEN BY HER DOG OVER LEFT SHIN 1 WEEK  AGO.


NOW HAS REDNESS SWELLING AND DRAINAGE FROM PUNCTURE SITES ASSOCIATED WITH LEG 

SWELLING AND PAIN UPON WEIGHT BEARING. HAS ASSOCIATED CHILLS, UNSURE OF FEVER.


Timing/Duration: week(s)


Severity: moderate


Modifying Factors: Improves With: movement


Associated Symptoms: nausea


Allergies/Adverse Reactions: 








cephalexin [From Keflex] Allergy (Verified 06/18/18 17:39)


 


Penicillins Allergy (Verified 06/18/18 17:39)


 


sulfamethoxazole [From Bactrim] Allergy (Verified 06/18/18 17:39)


 


trimethoprim [From Bactrim] Allergy (Verified 06/18/18 17:39)


 





Home Medications: 








Aspirin 81 gm Chew*** [Baby Aspirin 81 mg Chew***] 81 mg PO DAILY 11/22/13 [

History]


clonazePAM [Klonopin] 2 mg PO TID PRN 11/22/13 [History]


Hydrocodone Bit/Acetaminophen [Norco 7.5-325 Tablet] 1 tab PO Q6HPRN PRN 07/12/ 16 [History]


Spironolactone 25 mg*** [Aldactone 25 MG***] 25 mg PO DAILY 05/06/17 [History]


Magnesium Oxide 400 mg PO DAILY 03/29/18 [History]


Ergocalciferol (Vitamin D2) [Vitamin D2] 50,000 unit PO Q7D 06/18/18 [History]


Levothyroxine Sodium [Synthroid] 125 mcg PO DAILY 06/18/18 [History]


Metoprolol Succinate 100 mg PO DAILY 06/18/18 [History]


Potassium Chloride 10 Meq Tab* [Klor Con 10 MEQ***] 30 meq PO BID 06/18/18 [

History]





Hx Tetanus, Diphtheria Vaccination/Date Given:  (last tetanus unknown)


Hx Influenza Vaccination/Date Given: No


Hx Pneumococcal Vaccination/Date Given: No


Immunizations Up to Date: No





- Review of Systems


Constitutional: Chills


Eyes: No Symptoms


Ears, Nose, & Throat: No Symptoms


Respiratory: No Symptoms, No Cough, No Dyspnea


Cardiac: No Symptoms, No Chest Pain, No Edema, No Syncope


Abdominal/Gastrointestinal: Nausea, No Abdominal Pain, No Vomiting, No Diarrhea


Genitourinary Symptoms: No Symptoms, No Dysuria


Musculoskeletal: No Symptoms, No Back Pain, No Neck Pain


Skin: No Rash


Neurological: No Dizziness, No Focal Weakness, No Sensory Changes


Psychological: No Symptoms


Endocrine: No Symptoms


All Other Systems: Reviewed and Negative





- Past Medical History


Pertinent Past Medical History: Yes


Neurological History: No Pertinent History


ENT History: No Pertinent History


Cardiac History: Hypertension


Respiratory History: Asthma


Endocrine Medical History: No Pertinent History


Musculoskeletal History: No Pertinent History


GI Medical History: No Pertinent History


 History: Renal Disease


Psycho-Social History: No Pertinent History


Female Reproductive Disorders: No Pertinent History


Other Medical History: hx of acute renal failure. pt states function in wnl now





- Past Surgical History


Past Surgical History: Yes


Neuro Surgical History: No Pertinent History


Cardiac: No Pertinent History


Respiratory: No Pertinent History


Gastrointestinal: Appendectomy, Cholecystectomy


Genitourinary: No Pertinent History


Musculoskeletal: Orthopedic Surgery


Female Surgical History: Hysterectomy


Other Surgical History: right knee





- Social History


Smoking Status: Never smoker


Exposure to second hand smoke: No


Drug Use: none


Patient Lives Alone: No





- Female History


Hx Last Menstrual Period: hysterectomy


Hx Pregnant Now: No





- Nursing Vital Signs


Nursing Vital Signs: 


 Initial Vital Signs











Temperature  99.7 F   07/06/19 11:38


 


Pulse Rate  104 H  07/06/19 11:38


 


Respiratory Rate  24   07/06/19 11:38


 


Blood Pressure  149/78   07/06/19 11:38


 


O2 Sat by Pulse Oximetry  96   07/06/19 11:38








 Pain Scale











Pain Intensity                 4

















- Physical Exam


General Appearance: no apparent distress, alert


Eye Exam: PERRL/EOMI, eyes nml inspection


Ears, Nose, Throat Exam: normal ENT inspection, TMs normal, pharynx normal, 

moist mucous membranes


Neck Exam: normal inspection, non-tender, supple, full range of motion


Respiratory Exam: normal breath sounds, lungs clear, No respiratory distress


Cardiovascular Exam: regular rate/rhythm, normal heart sounds, normal 

peripheral pulses


Gastrointestinal/Abdomen Exam: soft, normal bowel sounds, No tenderness, No mass


Back Exam: normal inspection, normal range of motion, No CVA tenderness, No 

vertebral tenderness


Extremity Exam: normal range of motion, pelvis stable, tenderness (THERE ARE 

1MM PUNCTURE SITES X 2 DISTAL 3RD LEFT SHIN ANTERIOR LATERAL ASPECT WITH 

ERYTHERMA 10CM X 15CM, MODERATE CALF TENDERNESS, BILAT PEDIS PULSES 2+)


Neurologic Exam: alert, oriented x 3, cooperative, normal mood/affect, nml 

cerebellar function, nml station & gait, sensation nml, No motor deficits


Skin Exam: normal color, warm, dry, No rash


Lymphatic Exam: No adenopathy


**SpO2 Interpretation**: normal


SpO2: 96





- Radiology Ultrasound Exam


  ** Left Venous Lower Extremity


Ultrasound: discussed w/radiologist (NO EVIDENCE OF DVT)


Ordered Tests: 


 Active Orders 24 hr











 Category Date Time Status


 


 Cardiac Monitor STAT Care  07/06/19 13:10 Active


 


 EKG-ER Only STAT Care  07/06/19 13:09 Active


 


 IV Insertion STAT Care  07/06/19 13:07 Active


 


 CHEST 1 VIEW (PORTABLE) Stat Exams  07/06/19 13:08 Taken


 


 VENOUS UNILAT/LIMITED EXTREMIT [US] Stat Exams  07/06/19 14:17 Taken


 


 AMYLASE Stat Lab  07/06/19 12:40 Completed


 


 BLOOD CULTURE Stat Lab  07/06/19 12:50 Received


 


 CBC W DIFF Stat Lab  07/06/19 12:40 Completed


 


 CMP Stat Lab  07/06/19 12:40 Completed


 


 CULTURE,WOUND Stat Lab  07/06/19 13:05 Received


 


 D-DIMER QUANTITATION Stat Lab  07/06/19 12:40 Completed


 


 LIPASE Stat Lab  07/06/19 12:40 Completed


 


 Lactic Acid Stat Lab  07/06/19 12:18 Results


 


 PROTIME WITH INR Stat Lab  07/06/19 12:40 Completed


 


 TROPONIN Q3H Lab  07/06/19 12:40 Completed


 


 TROPONIN Q3H Lab  07/06/19 16:15 Ordered


 


 TROPONIN Q3H Lab  07/06/19 19:15 Ordered


 


 TROPONIN Q3H Lab  07/06/19 22:15 Ordered


 


 TROPONIN Q3H Lab  07/07/19 01:15 Ordered


 


 UA W/RFX UR CULTURE Stat Lab  07/06/19 13:05 Ordered


 


 Urinalysis with Microscopy Stat Lab  07/06/19 13:56 Ordered








Medication Summary











Generic Name Dose Route Start Last Admin





  Trade Name Carlita  PRN Reason Stop Dose Admin


 


Sodium Chloride  1,000 mls @ 100 mls/hr  07/06/19 12:30  07/06/19 13:04





  Sodium Chloride 0.9% 1000 Ml  IV  08/05/19 12:29  100 mls/hr





  .Q10H JAROCHO   Administration





     





     





     





     














Discontinued Medications














Generic Name Dose Route Start Last Admin





  Trade Name Carlita  PRN Reason Stop Dose Admin


 


Enoxaparin Sodium  120 mg  07/06/19 13:18  07/06/19 13:37





  Enoxaparin Sodium  SQ  07/06/19 13:19  120 mg





  STAT STA   Administration





     





     





     





     


 


Enoxaparin Sodium  Confirm  07/06/19 13:35  





  Enoxaparin Sodium  Administered  07/06/19 13:36  





  Dose   





  120 mg   





  SQ   





  .STK-MED ONE   





     





     





     





     


 


Vancomycin HCl  1 gm in 250 mls @ 167 mls/hr  07/06/19 12:22  07/06/19 13:04





  Vancomycin 1gm/ Ns 250ml***  IV  07/06/19 13:51  167 mls/hr





  STAT ONE   Administration





     





     





     





     


 


Vancomycin HCl  Confirm  07/06/19 12:47  





  Vancomycin 1gm/ Ns 250ml***  Administered  07/06/19 12:48  





  Dose   





  250 mls @ ud   





  IV   





  .STK-MED ONE   





     





     





     





     


 


Ondansetron HCl  4 mg  07/06/19 12:21  07/06/19 13:04





  Zofran 4 Mg/2 Ml Vial**  IV  07/06/19 12:22  4 mg





  STAT ONE   Administration





     





     





     





     


 


Ondansetron HCl  Confirm  07/06/19 12:46  





  Zofran 4 Mg/2 Ml Vial**  Administered  07/06/19 12:47  





  Dose   





  4 mg   





  .ROUTE   





  .STK-MED ONE   





     





     





     





     











Lab/Rad Data: 


 Laboratory Result Diagrams





 07/06/19 12:40 





 07/06/19 12:40 





 Laboratory Results











  07/06/19 07/06/19 07/06/19 Range/Units





  12:40 12:40 12:40 


 


WBC     (4.0-10.5)  K/mm3


 


RBC     (4.1-5.4)  M/mm3


 


Hgb     (12.0-16.0)  gm/dl


 


Hct     (35-47)  %


 


MCV     ()  fl


 


MCH     (26-32)  pg


 


MCHC     (32-36)  g/dl


 


RDW     (11.5-14.0)  %


 


Plt Count     (150-450)  K/mm3


 


MPV     (6-9.5)  fl


 


Gran %     (36.0-66.0)  %


 


Eos # (Auto)     (0-0.5)  


 


Absolute Lymphs (auto)     (1.0-4.6)  


 


Absolute Monos (auto)     (0.0-1.3)  


 


Lymphocytes %     (24.0-44.0)  %


 


Monocytes %     (0.0-12.0)  %


 


Eosinophils %     (0.00-5.0)  %


 


Basophils %     (0.0-0.4)  %


 


Absolute Granulocytes     (1.4-6.9)  


 


Basophils #     (0-0.4)  


 


PT   12.1   (9.95-12.35)  SECONDS


 


INR   1.07   (0.8-3.0)  


 


D-Dimer    665 H*  (215-500)  ng/mL


 


Sodium     (137-145)  mmol/L


 


Potassium     (3.5-5.1)  mmol/L


 


Chloride     ()  mmol/L


 


Carbon Dioxide     (22-30)  mmol/L


 


Anion Gap     (5-15)  MEQ/L


 


BUN     (7-17)  mg/dL


 


Creatinine     (0.52-1.04)  mg/dL


 


Estimated GFR     ML/MIN


 


Glucose     ()  mg/dL


 


Lactic Acid     (0.4-2.0)  


 


Calcium     (8.4-10.2)  mg/dL


 


Total Bilirubin     (0.2-1.3)  mg/dL


 


AST     (14-36)  U/L


 


ALT     (0-35)  U/L


 


Alkaline Phosphatase     ()  U/L


 


Troponin I  < 0.012    (0.000-0.034)  ng/mL


 


Serum Total Protein     (6.3-8.2)  g/dL


 


Albumin     (3.5-5.0)  g/dL


 


Amylase     ()  U/L


 


Lipase     ()  U/L














  07/06/19 07/06/19 07/06/19 Range/Units





  12:40 12:40 12:18 


 


WBC   21.1 H   (4.0-10.5)  K/mm3


 


RBC   4.66   (4.1-5.4)  M/mm3


 


Hgb   14.2   (12.0-16.0)  gm/dl


 


Hct   44.8   (35-47)  %


 


MCV   96.1   ()  fl


 


MCH   30.5   (26-32)  pg


 


MCHC   31.7 L   (32-36)  g/dl


 


RDW   14.3 H   (11.5-14.0)  %


 


Plt Count   207   (150-450)  K/mm3


 


MPV   8.5   (6-9.5)  fl


 


Gran %   91.8 H   (36.0-66.0)  %


 


Eos # (Auto)   0.05   (0-0.5)  


 


Absolute Lymphs (auto)   0.89 L   (1.0-4.6)  


 


Absolute Monos (auto)   0.77   (0.0-1.3)  


 


Lymphocytes %   4.2 L   (24.0-44.0)  %


 


Monocytes %   3.7   (0.0-12.0)  %


 


Eosinophils %   0.2   (0.00-5.0)  %


 


Basophils %   0.1   (0.0-0.4)  %


 


Absolute Granulocytes   19.36 H   (1.4-6.9)  


 


Basophils #   0.02   (0-0.4)  


 


PT     (9.95-12.35)  SECONDS


 


INR     (0.8-3.0)  


 


D-Dimer     (215-500)  ng/mL


 


Sodium  141    (137-145)  mmol/L


 


Potassium  3.7    (3.5-5.1)  mmol/L


 


Chloride  99    ()  mmol/L


 


Carbon Dioxide  32 H    (22-30)  mmol/L


 


Anion Gap  13.7    (5-15)  MEQ/L


 


BUN  20 H    (7-17)  mg/dL


 


Creatinine  1.52 H    (0.52-1.04)  mg/dL


 


Estimated GFR  37.6    ML/MIN


 


Glucose  137 H    ()  mg/dL


 


Lactic Acid    2.8 H  (0.4-2.0)  


 


Calcium  9.4    (8.4-10.2)  mg/dL


 


Total Bilirubin  0.70    (0.2-1.3)  mg/dL


 


AST  26    (14-36)  U/L


 


ALT  27    (0-35)  U/L


 


Alkaline Phosphatase  116    ()  U/L


 


Troponin I     (0.000-0.034)  ng/mL


 


Serum Total Protein  7.5    (6.3-8.2)  g/dL


 


Albumin  4.2    (3.5-5.0)  g/dL


 


Amylase  75    ()  U/L


 


Lipase  112    ()  U/L














- Progress


Progress Note: 





07/06/19 13:11


UNABLE TO PLACE PATIENT ON SEPSIS PROTOCOL DUE TO PERIPHERAL EDEMA AND STAGE 3 

RENAL DISEASE, LACTIC ACID 2.8, AFTER 2 SETS OF BLOOD CULTURES VANCOMYCIN 1GM 

IVPB





07/06/19 14:22 ADMINISTERED LOVENOX 120MG SUBQ





Discussed with : Josefina (DISCUSSED WITH DR RAHMAN AT 1511 FOR OBSERVATION)





- Departure


Departure Disposition: Observation


Clinical Impression: 


 CELLULITIS LEFT LOWER EXTREMITY





Condition: Stable


Critical Care Time: No


Referrals: 


MICHELLE VILLANUEVA [Primary Care Provider] -

## 2019-07-06 NOTE — XRAY
Indication: Left leg pain.  Dog bite.



2-dimensional sonogram and color Doppler imaging of the major venous vessels

of the left leg was performed.



Comparison: None



No thrombus seen in the examined deep venous vessels of the left leg including

greater saphenous vein.  Veins demonstrate normal compressibility.  Venous

waveforms are normal with and without augmentation.



Impression: Left leg negative for DVT.



Comment: Preliminary report was given.

## 2019-07-07 VITALS — OXYGEN SATURATION: 92 %

## 2019-07-07 VITALS — DIASTOLIC BLOOD PRESSURE: 58 MMHG | SYSTOLIC BLOOD PRESSURE: 120 MMHG | HEART RATE: 64 BPM

## 2019-07-07 LAB
ANION GAP SERPL CALC-SCNC: 10.5 MEQ/L (ref 5–15)
BUN SERPL-MCNC: 20 MG/DL (ref 7–17)
CALCIUM SPEC-MCNC: 8.5 MG/DL (ref 8.4–10.2)
CHLORIDE SERPL-SCNC: 101 MMOL/L (ref 98–107)
CO2 SERPL-SCNC: 30 MMOL/L (ref 22–30)
CREAT SERPL-MCNC: 1.62 MG/DL (ref 0.52–1.04)
GLUCOSE SERPL-MCNC: 121 MG/DL (ref 74–106)
HCT VFR BLD AUTO: 40.1 % (ref 35–47)
HGB BLD-MCNC: 12.7 GM/DL (ref 12–16)
MCH RBC QN AUTO: 31 PG (ref 26–32)
MCHC RBC AUTO-ENTMCNC: 31.7 G/DL (ref 32–36)
PLATELET # BLD AUTO: 151 K/MM3 (ref 150–450)
POTASSIUM SERPLBLD-SCNC: 4.1 MMOL/L (ref 3.5–5.1)
RBC # BLD AUTO: 4.09 M/MM3 (ref 4.1–5.4)
SODIUM SERPL-SCNC: 138 MMOL/L (ref 137–145)
WBC # BLD AUTO: 16.2 K/MM3 (ref 4–10.5)

## 2019-07-07 RX ADMIN — POTASSIUM CHLORIDE SCH MEQ: 10 TABLET, EXTENDED RELEASE ORAL at 09:18

## 2019-07-07 RX ADMIN — POTASSIUM CHLORIDE AND SODIUM CHLORIDE SCH MLS/HR: 900; 150 INJECTION, SOLUTION INTRAVENOUS at 09:14

## 2019-07-07 RX ADMIN — ACETAMINOPHEN PRN MG: 325 TABLET ORAL at 00:10

## 2019-07-07 NOTE — PCM.SSS
History of Present Illness





- Chief Complaint


Chief Complaint: left leg dog bite 1 week ago


History of Present Illness: 


 is a 54 year old female came to ER with pain and swelling of left 

leg happened after a dog bite 1 week ago.


"my dog was playing and bit my leg last week and the pain in my leg worse today

"
 





- Review of Systems


Constitutional: No Fever, No Chills


Eyes: No Symptoms


Ears, Nose, & Throat: No Symptoms


Respiratory: No Cough, No Short Of Breath


Cardiac: No Chest Pain, No Edema, No Syncope


Abdominal/Gastrointestinal: No Abdominal Pain, No Nausea, No Vomiting, No 

Diarrhea


Genitourinary Symptoms: No Dysuria


Musculoskeletal: No Back Pain, No Neck Pain


Skin: No Rash


Neurological: No Dizziness, No Focal Weakness, No Sensory Changes


Psychological: No Symptoms


Endocrine: No Symptoms


Hematologic/Lymphatic: No Symptoms


Immunological/Allergic: No Symptoms





Medications & Allergies


Home Medications: 


 Home Medication List





Aspirin 81 gm Chew*** [Baby Aspirin 81 mg Chew***] 81 mg PO DAILY 11/22/13 [

History Confirmed 07/06/19]


clonazePAM [Klonopin] 2 mg PO TID PRN 11/22/13 [History Confirmed 07/06/19]


Hydrocodone Bit/Acetaminophen [Norco 7.5-325 Tablet] 1 tab PO Q6HPRN PRN 07/12/ 16 [History Confirmed 07/06/19]


Promethazine HCl 25 mg*** [Phenergan 25 mg***] 25 mg PO Q8H PRN PRN #10 tablet 

05/06/17 [Rx Confirmed 07/06/19]


Spironolactone 25 mg*** [Aldactone 25 MG***] 25 mg PO DAILY 05/06/17 [History 

Confirmed 07/06/19]


Magnesium Oxide 400 mg PO DAILY 03/29/18 [History Confirmed 07/06/19]


Ergocalciferol (Vitamin D2) [Vitamin D2] 50,000 unit PO Q7D 06/18/18 [History 

Confirmed 07/06/19]


Levothyroxine Sodium [Synthroid] 125 mcg PO DAILY 06/18/18 [History Confirmed 07 /06/19]


Metoprolol Succinate 100 mg PO DAILY 06/18/18 [History Confirmed 07/06/19]


Potassium Chloride 10 Meq Tab* [Klor Con 10 MEQ***] 30 meq PO BID 06/18/18 [

History Confirmed 07/06/19]


Levofloxacin [Levaquin] 500 mg PO DAILY #7 tablet 07/07/19 [Rx]








Allergies/Adverse Reactions: 


 Allergies











Allergy/AdvReac Type Severity Reaction Status Date / Time


 


cephalexin [From Keflex] Allergy   Verified 06/18/18 17:39


 


Penicillins Allergy   Verified 06/18/18 17:39


 


sulfamethoxazole Allergy   Verified 06/18/18 17:39





[From Bactrim]     


 


trimethoprim [From Bactrim] Allergy   Verified 06/18/18 17:39














- Past Medical History


Past Medical History: Yes


Neurological History: No Pertinent History


ENT History: No Pertinent History


Cardiac History: Hypertension


Respiratory History: Asthma


Endocrine Medical History: No Pertinent History


Musculoskelatal History: No Pertinent History


GI Medical History: No Pertinent History


 History: Renal Disease


Pyscho-Social History: No Pertinent History


Reproductive Disorders: No Pertinent History


Comment: hx of  renal failure stage 3.





- Female History


Hx Last Menstrual Period: hysterectomy


Are you pregnant now?: No





- Past Surgical History


Past Surgical History: Yes


Neuro Surgical History: No Pertinent History


Cardiac History: No Pertinent History


Respiratory Surgery: No Pertinent History


GI Surgical History: Appendectomy, Cholecystectomy


Genitourinary Surgical Hx: No Pertinent History


Musculskeletal Surgical Hx: Orthopedic Surgery


Female Surgical History: Hysterectomy


Other Surgical History: right knee





- Social History


Smoking Status: Never smoker


Exposure to second hand smoke: No


Alcohol: None


Drug Use: none





- Physical Exam


Vital Signs: 


 Vital Signs - 24 hr











  Temp Pulse Resp BP Pulse Ox


 


 07/07/19 11:21  98.4 F  64  20  120/58  92 L


 


 07/07/19 07:15  98 F  68  20  110/58  92 L


 


 07/07/19 04:07  99.7 F  91 H  18  108/51  94 L


 


 07/06/19 23:45  100.8 F  112 H  18  110/56  83 L


 


 07/06/19 20:05  102.4 F  62  20  114/55  93 L


 


 07/06/19 15:25  98.4 F  108 H  22  119/58  92 L


 


 07/06/19 15:24  98.4 F  108 H  22  119/58  92 L


 


 07/06/19 14:24      96


 


 07/06/19 13:05   96 H  18  141/73  98








 Oxygen-Last 24 hours











O2 Percentage                  2 Liters = 28%


 


O2 Percentage                  2 Liters = 28%














General Appearance: no apparent distress, alert


Neurologic Exam: alert, oriented x 3, cooperative, normal mood/affect, nml 

cerebellar function, nml station & gait, sensation nml, No motor deficits


Eye Exam: PERRL/EOMI, eyes nml inspection


Ears, Nose, Throat Exam: normal ENT inspection, TMs normal, pharynx normal, 

moist mucous membranes


Neck Exam: normal inspection, non-tender, supple, full range of motion


Respiratory Exam: normal breath sounds, lungs clear, No respiratory distress


Cardiovascular Exam: regular rate/rhythm, normal heart sounds, normal 

peripheral pulses


Gastrointestinal/Abdomen Exam: soft, normal bowel sounds, No tenderness, No mass


Back Exam: normal inspection, normal range of motion, No CVA tenderness, No 

vertebral tenderness


Extremity Exam: normal inspection, normal range of motion, pelvis stable


Skin Exam: normal color, warm, dry, No rash


Wound Assessment: 


 Skin/Wound Assessment





Wound/Incision Assessment                                  Start:  07/06/19 15:

06


Text:                                                      Status: Active      

  


Freq:   Q6H                                                                    

  


Protocol:                                                                      

  


 Document     07/07/19 07:54  BA  (Rec: 07/07/19 08:12  BA  [address])


 Wound/Incision Assessment


     Right Lower Abdomen


      Wound Assessment                           Shift Assessment


      Wound Stage                                Non Pressure Wound


      Drainage Description                       Sanguineous


      Drainage Odor                              Mild Odor


      Surrounding Tissue                         Dark Red


      Comment                                    abd fold.  barrier ointment


                                                 applied; place a pillow case


                                                 in fold to prevent skin to


                                                 skin contact.


     Right Lower Chest


      Wound Assessment                           Shift Assessment


      Wound Type                                 excoriation.


      Wound Stage                                Non Pressure Wound


      Drainage Amount                            None


      Drainage Odor                              None/Absent


      General Appearance                         Unapproximated


      Wound Bed Greatest Portion                 Red (Granulation)


                                                 Shiny


      Surrounding Tissue                         Pink


      Comment                                    barrier ointment applied.


     Left Lower Calf


      Wound Assessment                           Shift Assessment


      Wound Type                                 cellulitis


      Wound Stage                                Non Pressure Wound


      Dressing Status                            Dry & Intact


      Drainage Amount                            None


      Surrounding Tissue                         Pink


      Comment                                    open to air; barrier ointment


                                                 applied.


 Wound Photo


     Photo Taken                                 Yes


     Date:                                       07/06/19


     Time:                                       16:00


     Distance from Wound:                        bedside.


     Comment:                                    PX of LLL.








Lymphatic Exam: No adenopathy





Results





- Labs


Lab/Micro Results: 


 Accuchecks











Date                           07/07/19


 


Date                           07/07/19


 


Date                           07/06/19


 


Date                           07/06/19


 


Time                           11:30


 


Time                           07:30


 


Time                           22:00


 


Time                           16:00


 


Accucheck Value:               231


 


Accucheck Value:               136


 


Accucheck Value:               143


 


Accucheck Value:               119











 Lab Results-Last 24 Hours











  07/06/19 07/06/19 07/06/19 Range/Units





  12:40 12:40 12:40 


 


WBC  21.1 H    (4.0-10.5)  K/mm3


 


RBC  4.66    (4.1-5.4)  M/mm3


 


Hgb  14.2    (12.0-16.0)  gm/dl


 


Hct  44.8    (35-47)  %


 


MCV  96.1    ()  fl


 


MCH  30.5    (26-32)  pg


 


MCHC  31.7 L    (32-36)  g/dl


 


RDW  14.3 H    (11.5-14.0)  %


 


Plt Count  207    (150-450)  K/mm3


 


MPV  8.5    (6-9.5)  fl


 


Gran %  91.8 H    (36.0-66.0)  %


 


Eos # (Auto)  0.05    (0-0.5)  


 


Absolute Lymphs (auto)  0.89 L    (1.0-4.6)  


 


Absolute Monos (auto)  0.77    (0.0-1.3)  


 


Lymphocytes %  4.2 L    (24.0-44.0)  %


 


Monocytes %  3.7    (0.0-12.0)  %


 


Eosinophils %  0.2    (0.00-5.0)  %


 


Basophils %  0.1    (0.0-0.4)  %


 


Absolute Granulocytes  19.36 H    (1.4-6.9)  


 


Basophils #  0.02    (0-0.4)  


 


PT     (9.95-12.35)  SECONDS


 


INR     (0.8-3.0)  


 


D-Dimer    665 H*  (215-500)  ng/mL


 


Sodium   141   (137-145)  mmol/L


 


Potassium   3.7   (3.5-5.1)  mmol/L


 


Chloride   99   ()  mmol/L


 


Carbon Dioxide   32 H   (22-30)  mmol/L


 


Anion Gap   13.7   (5-15)  MEQ/L


 


BUN   20 H   (7-17)  mg/dL


 


Creatinine   1.52 H   (0.52-1.04)  mg/dL


 


Estimated GFR   37.6   ML/MIN


 


Glucose   137 H   ()  mg/dL


 


Hemoglobin A1c     (4.5-6.0)  %


 


Calcium   9.4   (8.4-10.2)  mg/dL


 


Total Bilirubin   0.70   (0.2-1.3)  mg/dL


 


AST   26   (14-36)  U/L


 


ALT   27   (0-35)  U/L


 


Alkaline Phosphatase   116   ()  U/L


 


Troponin I     (0.000-0.034)  ng/mL


 


Serum Total Protein   7.5   (6.3-8.2)  g/dL


 


Albumin   4.2   (3.5-5.0)  g/dL


 


Amylase   75   ()  U/L


 


Lipase   112   ()  U/L


 


Urine Color     (YELLOW)  


 


Urine Appearance     (CLEAR)  


 


Urine pH     (5-6)  


 


Ur Specific Gravity     (1.005-1.025)  


 


Urine Protein     (Negative)  


 


Urine Ketones     (NEGATIVE)  


 


Urine Blood     (0-5)  Dougie/ul


 


Urine Nitrite     (NEGATIVE)  


 


Urine Bilirubin     (NEGATIVE)  


 


Urine Urobilinogen     (0-1)  mg/dL


 


Ur Leukocyte Esterase     (NEGATIVE)  


 


Urine WBC (Auto)     (0-5)  /HPF


 


Urine RBC (Auto)     (0-2)  /HPF


 


U Epithel Cells (Auto)     (FEW)  /HPF


 


Urine Bacteria (Auto)     (NEGATIVE)  /HPF


 


Urine Mucus (Auto)     (NEGATIVE)  /HPF


 


Urine Culture Reflexed     (NO)  


 


Urine Glucose     (NEGATIVE)  mg/dL














  07/06/19 07/06/19 07/06/19 Range/Units





  12:40 12:40 14:01 


 


WBC     (4.0-10.5)  K/mm3


 


RBC     (4.1-5.4)  M/mm3


 


Hgb     (12.0-16.0)  gm/dl


 


Hct     (35-47)  %


 


MCV     ()  fl


 


MCH     (26-32)  pg


 


MCHC     (32-36)  g/dl


 


RDW     (11.5-14.0)  %


 


Plt Count     (150-450)  K/mm3


 


MPV     (6-9.5)  fl


 


Gran %     (36.0-66.0)  %


 


Eos # (Auto)     (0-0.5)  


 


Absolute Lymphs (auto)     (1.0-4.6)  


 


Absolute Monos (auto)     (0.0-1.3)  


 


Lymphocytes %     (24.0-44.0)  %


 


Monocytes %     (0.0-12.0)  %


 


Eosinophils %     (0.00-5.0)  %


 


Basophils %     (0.0-0.4)  %


 


Absolute Granulocytes     (1.4-6.9)  


 


Basophils #     (0-0.4)  


 


PT  12.1    (9.95-12.35)  SECONDS


 


INR  1.07    (0.8-3.0)  


 


D-Dimer     (215-500)  ng/mL


 


Sodium     (137-145)  mmol/L


 


Potassium     (3.5-5.1)  mmol/L


 


Chloride     ()  mmol/L


 


Carbon Dioxide     (22-30)  mmol/L


 


Anion Gap     (5-15)  MEQ/L


 


BUN     (7-17)  mg/dL


 


Creatinine     (0.52-1.04)  mg/dL


 


Estimated GFR     ML/MIN


 


Glucose     ()  mg/dL


 


Hemoglobin A1c     (4.5-6.0)  %


 


Calcium     (8.4-10.2)  mg/dL


 


Total Bilirubin     (0.2-1.3)  mg/dL


 


AST     (14-36)  U/L


 


ALT     (0-35)  U/L


 


Alkaline Phosphatase     ()  U/L


 


Troponin I   < 0.012   (0.000-0.034)  ng/mL


 


Serum Total Protein     (6.3-8.2)  g/dL


 


Albumin     (3.5-5.0)  g/dL


 


Amylase     ()  U/L


 


Lipase     ()  U/L


 


Urine Color    STRAW  (YELLOW)  


 


Urine Appearance    CLEAR  (CLEAR)  


 


Urine pH    7.0  (5-6)  


 


Ur Specific Gravity    1.006  (1.005-1.025)  


 


Urine Protein    NEGATIVE  (Negative)  


 


Urine Ketones    NEGATIVE  (NEGATIVE)  


 


Urine Blood    NEGATIVE  (0-5)  Dougie/ul


 


Urine Nitrite    NEGATIVE  (NEGATIVE)  


 


Urine Bilirubin    NEGATIVE  (NEGATIVE)  


 


Urine Urobilinogen    NEGATIVE  (0-1)  mg/dL


 


Ur Leukocyte Esterase    NEGATIVE  (NEGATIVE)  


 


Urine WBC (Auto)    0-2  (0-5)  /HPF


 


Urine RBC (Auto)    NONE  (0-2)  /HPF


 


U Epithel Cells (Auto)    NONE  (FEW)  /HPF


 


Urine Bacteria (Auto)    NONE  (NEGATIVE)  /HPF


 


Urine Mucus (Auto)    SLIGHT  (NEGATIVE)  /HPF


 


Urine Culture Reflexed    NO  (NO)  


 


Urine Glucose    NEGATIVE  (NEGATIVE)  mg/dL














  07/06/19 07/06/19 07/06/19 Range/Units





  16:09 19:20 22:15 


 


WBC     (4.0-10.5)  K/mm3


 


RBC     (4.1-5.4)  M/mm3


 


Hgb     (12.0-16.0)  gm/dl


 


Hct     (35-47)  %


 


MCV     ()  fl


 


MCH     (26-32)  pg


 


MCHC     (32-36)  g/dl


 


RDW     (11.5-14.0)  %


 


Plt Count     (150-450)  K/mm3


 


MPV     (6-9.5)  fl


 


Gran %     (36.0-66.0)  %


 


Eos # (Auto)     (0-0.5)  


 


Absolute Lymphs (auto)     (1.0-4.6)  


 


Absolute Monos (auto)     (0.0-1.3)  


 


Lymphocytes %     (24.0-44.0)  %


 


Monocytes %     (0.0-12.0)  %


 


Eosinophils %     (0.00-5.0)  %


 


Basophils %     (0.0-0.4)  %


 


Absolute Granulocytes     (1.4-6.9)  


 


Basophils #     (0-0.4)  


 


PT     (9.95-12.35)  SECONDS


 


INR     (0.8-3.0)  


 


D-Dimer     (215-500)  ng/mL


 


Sodium     (137-145)  mmol/L


 


Potassium     (3.5-5.1)  mmol/L


 


Chloride     ()  mmol/L


 


Carbon Dioxide     (22-30)  mmol/L


 


Anion Gap     (5-15)  MEQ/L


 


BUN     (7-17)  mg/dL


 


Creatinine     (0.52-1.04)  mg/dL


 


Estimated GFR     ML/MIN


 


Glucose     ()  mg/dL


 


Hemoglobin A1c     (4.5-6.0)  %


 


Calcium     (8.4-10.2)  mg/dL


 


Total Bilirubin     (0.2-1.3)  mg/dL


 


AST     (14-36)  U/L


 


ALT     (0-35)  U/L


 


Alkaline Phosphatase     ()  U/L


 


Troponin I  < 0.012  < 0.012  < 0.012  (0.000-0.034)  ng/mL


 


Serum Total Protein     (6.3-8.2)  g/dL


 


Albumin     (3.5-5.0)  g/dL


 


Amylase     ()  U/L


 


Lipase     ()  U/L


 


Urine Color     (YELLOW)  


 


Urine Appearance     (CLEAR)  


 


Urine pH     (5-6)  


 


Ur Specific Gravity     (1.005-1.025)  


 


Urine Protein     (Negative)  


 


Urine Ketones     (NEGATIVE)  


 


Urine Blood     (0-5)  Dougie/ul


 


Urine Nitrite     (NEGATIVE)  


 


Urine Bilirubin     (NEGATIVE)  


 


Urine Urobilinogen     (0-1)  mg/dL


 


Ur Leukocyte Esterase     (NEGATIVE)  


 


Urine WBC (Auto)     (0-5)  /HPF


 


Urine RBC (Auto)     (0-2)  /HPF


 


U Epithel Cells (Auto)     (FEW)  /HPF


 


Urine Bacteria (Auto)     (NEGATIVE)  /HPF


 


Urine Mucus (Auto)     (NEGATIVE)  /HPF


 


Urine Culture Reflexed     (NO)  


 


Urine Glucose     (NEGATIVE)  mg/dL














  07/06/19 07/07/19 Range/Units





  Unknown 01:30 


 


WBC    (4.0-10.5)  K/mm3


 


RBC    (4.1-5.4)  M/mm3


 


Hgb    (12.0-16.0)  gm/dl


 


Hct    (35-47)  %


 


MCV    ()  fl


 


MCH    (26-32)  pg


 


MCHC    (32-36)  g/dl


 


RDW    (11.5-14.0)  %


 


Plt Count    (150-450)  K/mm3


 


MPV    (6-9.5)  fl


 


Gran %    (36.0-66.0)  %


 


Eos # (Auto)    (0-0.5)  


 


Absolute Lymphs (auto)    (1.0-4.6)  


 


Absolute Monos (auto)    (0.0-1.3)  


 


Lymphocytes %    (24.0-44.0)  %


 


Monocytes %    (0.0-12.0)  %


 


Eosinophils %    (0.00-5.0)  %


 


Basophils %    (0.0-0.4)  %


 


Absolute Granulocytes    (1.4-6.9)  


 


Basophils #    (0-0.4)  


 


PT    (9.95-12.35)  SECONDS


 


INR    (0.8-3.0)  


 


D-Dimer    (215-500)  ng/mL


 


Sodium    (137-145)  mmol/L


 


Potassium    (3.5-5.1)  mmol/L


 


Chloride    ()  mmol/L


 


Carbon Dioxide    (22-30)  mmol/L


 


Anion Gap    (5-15)  MEQ/L


 


BUN    (7-17)  mg/dL


 


Creatinine    (0.52-1.04)  mg/dL


 


Estimated GFR    ML/MIN


 


Glucose    ()  mg/dL


 


Hemoglobin A1c  6.19 H   (4.5-6.0)  %


 


Calcium    (8.4-10.2)  mg/dL


 


Total Bilirubin    (0.2-1.3)  mg/dL


 


AST    (14-36)  U/L


 


ALT    (0-35)  U/L


 


Alkaline Phosphatase    ()  U/L


 


Troponin I   < 0.012  (0.000-0.034)  ng/mL


 


Serum Total Protein    (6.3-8.2)  g/dL


 


Albumin    (3.5-5.0)  g/dL


 


Amylase    ()  U/L


 


Lipase    ()  U/L


 


Urine Color    (YELLOW)  


 


Urine Appearance    (CLEAR)  


 


Urine pH    (5-6)  


 


Ur Specific Gravity    (1.005-1.025)  


 


Urine Protein    (Negative)  


 


Urine Ketones    (NEGATIVE)  


 


Urine Blood    (0-5)  Dougie/ul


 


Urine Nitrite    (NEGATIVE)  


 


Urine Bilirubin    (NEGATIVE)  


 


Urine Urobilinogen    (0-1)  mg/dL


 


Ur Leukocyte Esterase    (NEGATIVE)  


 


Urine WBC (Auto)    (0-5)  /HPF


 


Urine RBC (Auto)    (0-2)  /HPF


 


U Epithel Cells (Auto)    (FEW)  /HPF


 


Urine Bacteria (Auto)    (NEGATIVE)  /HPF


 


Urine Mucus (Auto)    (NEGATIVE)  /HPF


 


Urine Culture Reflexed    (NO)  


 


Urine Glucose    (NEGATIVE)  mg/dL








 Microbiology











 07/06/19 13:05 Wound Culture - Preliminary





 Leg - Left Lower    GRAM NEGATIVE ID AND SENSITIVITY PENDING








 Accuchecks











Date                           07/07/19


 


Date                           07/07/19


 


Date                           07/06/19


 


Date                           07/06/19


 


Time                           11:30


 


Time                           07:30


 


Time                           22:00


 


Time                           16:00


 


Accucheck Value:               231


 


Accucheck Value:               136


 


Accucheck Value:               143


 


Accucheck Value:               119

















- Radiology Impressions


Radiology Exams & Impressions: 


 Radiology Procedures











 Category Date Time Status


 


 CHEST 1 VIEW (PORTABLE) Stat Exams  07/06/19 13:08 Completed


 


 VENOUS UNILAT/LIMITED EXTREMIT [US] Stat Exams  07/06/19 14:17 Completed














- Other Procedures and Tests


 Respiratory Therapy





07/07/19 01:43


Oxygen Nasal Cannula 2 lpm 














Assessment/Plan


(1) Left leg cellulitis


Current Visit: Yes   Status: Acute   


Assessment & Plan: 


 Respiratory Therapy





07/07/19 01:43


Oxygen Nasal Cannula 2 lpm 








 Last Vital Signs











Temp  98.4 F   07/07/19 11:21


 


Pulse  64   07/07/19 11:21


 


Resp  20   07/07/19 11:21


 


BP  120/58   07/07/19 11:21


 


Pulse Ox  92 L  07/07/19 11:21








Allergies





cephalexin [From Keflex] Allergy (Verified 06/18/18 17:39)


 


Penicillins Allergy (Verified 06/18/18 17:39)


 


sulfamethoxazole [From Bactrim] Allergy (Verified 06/18/18 17:39)


 


trimethoprim [From Bactrim] Allergy (Verified 06/18/18 17:39)


 





Active Medications





Acetaminophen (Tylenol 325 Mg***)  650 mg PO Q4H PRN PRN


   PRN Reason: PAIN AND/OR FEVER


   Stop: 08/05/19 14:24


   Last Admin: 07/07/19 00:10 Dose:  650 mg


Hydrocodone Bitart/Acetaminophen (Norco 7.5/325 Mg Tab***)  1 tab PO Q6H PRN PRN


   PRN Reason: PAIN


   Stop: 07/11/19 14:27


   Last Admin: 07/06/19 21:37 Dose:  1 tab


Aspirin (Ecotrin 81 Mg***)  81 mg PO [facility]


   Stop: 08/06/19 09:59


   Last Admin: 07/07/19 09:17 Dose:  81 mg


Clonazepam (Klonopin***)  2 mg PO TID PRN PRN


   Stop: 08/05/19 15:59


Ergocalciferol (Vitamin D2)  50,000 unit PO Mo@1000 [facility]


   Stop: 08/07/19 09:59


Potassium Chloride/Sodium Chloride (Sodium Chloride 0.9% W/ 20 Meq Kcl/Liter)  1

,000 mls @ 60 mls/hr IV .[id number] [facility]


   Stop: 08/05/19 14:29


   Last Admin: 07/07/19 09:14 Dose:  60 mls/hr


Vancomycin HCl 2 gm/ Sodium (Chloride)  500 mls @ 250 mls/hr IV Q24H [facility]


   Stop: 08/06/19 15:59


Levothyroxine Sodium (Synthroid 125 Mcg***)  125 mcg PO QAM [facility]


   Stop: 08/06/19 09:59


   Last Admin: 07/07/19 09:18 Dose:  125 mcg


Magnesium Oxide (Mag-Ox 400***)  400 mg PO DAILY [facility]


   Stop: 08/06/19 09:59


   Last Admin: 07/07/19 09:18 Dose:  400 mg


Metoprolol Succinate (Toprol Xl 100 Mg***)  100 mg PO DAILY [facility]


   Stop: 08/06/19 09:59


   Last Admin: 07/07/19 09:29 Dose:  100 mg


Morphine Sulfate (Morphine Sulfate 4 Mg Inj***)  4 mg IV Q4H PRN PRN


   PRN Reason: PAIN


   Stop: 07/11/19 14:24


Ondansetron HCl (Zofran 4 Mg/2 Ml Vial**)  4 mg IV Q6H PRN PRN


   PRN Reason: NAUSEA/VOMITING


   Stop: 08/05/19 14:24


Potassium Chloride (Klor Con 10 Meq***)  30 meq PO BID [facility]


   Stop: 08/05/19 21:59


   Last Admin: 07/07/19 09:18 Dose:  30 meq


Promethazine HCl (Phenergan 25 Mg***)  25 mg PO Q8H PRN PRN


   PRN Reason: NAUSEA


   Stop: 08/05/19 15:46


Spironolactone (Aldactone 25 Mg***)  25 mg PO DAILY [facility]


   Stop: 08/06/19 09:59


   Last Admin: 07/07/19 09:17 Dose:  25 mg





 Intake & Output











 07/07/19 07/08/19





 11:59 11:59


 


Intake Total 2109 


 


Output Total 1550 


 


Balance 559 


 


Weight 158 kg 








 Orders





07/06/19 15:47


Promethazine HCl 25 mg*** [Phenergan 25 mg***]   25 mg PO Q8H PRN PRN 





07/06/19 16:00


Clonazepam*** [Klonopin***]   2 mg PO TID PRN PRN 





07/06/19 20:25


ACCUCHECK [Accucheck] ACHS 





07/07/19 10:00


Magnesium Oxide 400 mg*** [Mag-Ox 400***]   400 mg PO DAILY 





07/07/19 11:32


CBC Routine 





07/07/19 16:00


Vancomycin HCl 1 gm Inj*** [Vancocin 1 gm Vial***] 2 gm   NaCl 0.9% 500 ml [

Sodium Chloride 0.9% 500 ML] 500 ml IV Q24H 





07/08/19 10:00


Ergocalciferol (Vitamin D2) [Vitamin D2]   50,000 unit PO Mo@1000 








 Lab Tests











  07/06/19 07/06/19 07/06/19





  12:40 12:40 12:40


 


WBC  21.1 H  


 


RBC  4.66  


 


Hgb  14.2  


 


Hct  44.8  


 


MCV  96.1  


 


MCH  30.5  


 


MCHC  31.7 L  


 


RDW  14.3 H  


 


Plt Count  207  


 


MPV  8.5  


 


Gran %  91.8 H  


 


Eos # (Auto)  0.05  


 


Absolute Lymphs (auto)  0.89 L  


 


Absolute Monos (auto)  0.77  


 


Lymphocytes %  4.2 L  


 


Monocytes %  3.7  


 


Eosinophils %  0.2  


 


Basophils %  0.1  


 


Absolute Granulocytes  19.36 H  


 


Basophils #  0.02  


 


PT   


 


INR   


 


D-Dimer    665 H*


 


Sodium   141 


 


Potassium   3.7 


 


Chloride   99 


 


Carbon Dioxide   32 H 


 


Anion Gap   13.7 


 


BUN   20 H 


 


Creatinine   1.52 H 


 


Estimated GFR   37.6 


 


Glucose   137 H 


 


Hemoglobin A1c   


 


Calcium   9.4 


 


Total Bilirubin   0.70 


 


AST   26 


 


ALT   27 


 


Alkaline Phosphatase   116 


 


Troponin I   


 


Serum Total Protein   7.5 


 


Albumin   4.2 


 


Amylase   75 


 


Lipase   112 


 


Urine Color   


 


Urine Appearance   


 


Urine pH   


 


Ur Specific Gravity   


 


Urine Protein   


 


Urine Ketones   


 


Urine Blood   


 


Urine Nitrite   


 


Urine Bilirubin   


 


Urine Urobilinogen   


 


Ur Leukocyte Esterase   


 


Urine WBC (Auto)   


 


Urine RBC (Auto)   


 


U Epithel Cells (Auto)   


 


Urine Bacteria (Auto)   


 


Urine Mucus (Auto)   


 


Urine Culture Reflexed   


 


Urine Glucose   














  07/06/19 07/06/19 07/06/19





  12:40 12:40 14:01


 


WBC   


 


RBC   


 


Hgb   


 


Hct   


 


MCV   


 


MCH   


 


MCHC   


 


RDW   


 


Plt Count   


 


MPV   


 


Gran %   


 


Eos # (Auto)   


 


Absolute Lymphs (auto)   


 


Absolute Monos (auto)   


 


Lymphocytes %   


 


Monocytes %   


 


Eosinophils %   


 


Basophils %   


 


Absolute Granulocytes   


 


Basophils #   


 


PT  12.1  


 


INR  1.07  


 


D-Dimer   


 


Sodium   


 


Potassium   


 


Chloride   


 


Carbon Dioxide   


 


Anion Gap   


 


BUN   


 


Creatinine   


 


Estimated GFR   


 


Glucose   


 


Hemoglobin A1c   


 


Calcium   


 


Total Bilirubin   


 


AST   


 


ALT   


 


Alkaline Phosphatase   


 


Troponin I   < 0.012 


 


Serum Total Protein   


 


Albumin   


 


Amylase   


 


Lipase   


 


Urine Color    STRAW


 


Urine Appearance    CLEAR


 


Urine pH    7.0


 


Ur Specific Gravity    1.006


 


Urine Protein    NEGATIVE


 


Urine Ketones    NEGATIVE


 


Urine Blood    NEGATIVE


 


Urine Nitrite    NEGATIVE


 


Urine Bilirubin    NEGATIVE


 


Urine Urobilinogen    NEGATIVE


 


Ur Leukocyte Esterase    NEGATIVE


 


Urine WBC (Auto)    0-2


 


Urine RBC (Auto)    NONE


 


U Epithel Cells (Auto)    NONE


 


Urine Bacteria (Auto)    NONE


 


Urine Mucus (Auto)    SLIGHT


 


Urine Culture Reflexed    NO


 


Urine Glucose    NEGATIVE














  07/06/19 07/06/19 07/06/19





  16:09 19:20 22:15


 


WBC   


 


RBC   


 


Hgb   


 


Hct   


 


MCV   


 


MCH   


 


MCHC   


 


RDW   


 


Plt Count   


 


MPV   


 


Gran %   


 


Eos # (Auto)   


 


Absolute Lymphs (auto)   


 


Absolute Monos (auto)   


 


Lymphocytes %   


 


Monocytes %   


 


Eosinophils %   


 


Basophils %   


 


Absolute Granulocytes   


 


Basophils #   


 


PT   


 


INR   


 


D-Dimer   


 


Sodium   


 


Potassium   


 


Chloride   


 


Carbon Dioxide   


 


Anion Gap   


 


BUN   


 


Creatinine   


 


Estimated GFR   


 


Glucose   


 


Hemoglobin A1c   


 


Calcium   


 


Total Bilirubin   


 


AST   


 


ALT   


 


Alkaline Phosphatase   


 


Troponin I  < 0.012  < 0.012  < 0.012


 


Serum Total Protein   


 


Albumin   


 


Amylase   


 


Lipase   


 


Urine Color   


 


Urine Appearance   


 


Urine pH   


 


Ur Specific Gravity   


 


Urine Protein   


 


Urine Ketones   


 


Urine Blood   


 


Urine Nitrite   


 


Urine Bilirubin   


 


Urine Urobilinogen   


 


Ur Leukocyte Esterase   


 


Urine WBC (Auto)   


 


Urine RBC (Auto)   


 


U Epithel Cells (Auto)   


 


Urine Bacteria (Auto)   


 


Urine Mucus (Auto)   


 


Urine Culture Reflexed   


 


Urine Glucose   














  07/06/19 07/07/19





  Unknown 01:30


 


WBC  


 


RBC  


 


Hgb  


 


Hct  


 


MCV  


 


MCH  


 


MCHC  


 


RDW  


 


Plt Count  


 


MPV  


 


Gran %  


 


Eos # (Auto)  


 


Absolute Lymphs (auto)  


 


Absolute Monos (auto)  


 


Lymphocytes %  


 


Monocytes %  


 


Eosinophils %  


 


Basophils %  


 


Absolute Granulocytes  


 


Basophils #  


 


PT  


 


INR  


 


D-Dimer  


 


Sodium  


 


Potassium  


 


Chloride  


 


Carbon Dioxide  


 


Anion Gap  


 


BUN  


 


Creatinine  


 


Estimated GFR  


 


Glucose  


 


Hemoglobin A1c  6.19 H 


 


Calcium  


 


Total Bilirubin  


 


AST  


 


ALT  


 


Alkaline Phosphatase  


 


Troponin I   < 0.012


 


Serum Total Protein  


 


Albumin  


 


Amylase  


 


Lipase  


 


Urine Color  


 


Urine Appearance  


 


Urine pH  


 


Ur Specific Gravity  


 


Urine Protein  


 


Urine Ketones  


 


Urine Blood  


 


Urine Nitrite  


 


Urine Bilirubin  


 


Urine Urobilinogen  


 


Ur Leukocyte Esterase  


 


Urine WBC (Auto)  


 


Urine RBC (Auto)  


 


U Epithel Cells (Auto)  


 


Urine Bacteria (Auto)  


 


Urine Mucus (Auto)  


 


Urine Culture Reflexed  


 


Urine Glucose  








Microbiology





07/06/19 13:05   Leg - Left Lower   Wound Culture - Preliminary


                            GRAM NEGATIVE ID AND SENSITIVITY PENDING








Code(s): L03.116 - CELLULITIS OF LEFT LOWER LIMB   





Hospital Summary





- Hospital Course


Hospital Course: 





 Chief Complaint





Diagnosis                        left leg dog bite 1 week ago





 Allergies











Allergy/AdvReac Type Severity Reaction Status Date / Time


 


cephalexin [From Keflex] Allergy   Verified 06/18/18 17:39


 


Penicillins Allergy   Verified 06/18/18 17:39


 


sulfamethoxazole Allergy   Verified 06/18/18 17:39





[From Bactrim]     


 


trimethoprim [From Bactrim] Allergy   Verified 06/18/18 17:39








 Vital Signs (Last 24 hours)











  Temp Pulse Resp BP Pulse Ox


 


 07/07/19 11:21  98.4 F  64  20  120/58  92 L


 


 07/07/19 07:15  98 F  68  20  110/58  92 L


 


 07/07/19 04:07  99.7 F  91 H  18  108/51  94 L


 


 07/06/19 23:45  100.8 F  112 H  18  110/56  83 L


 


 07/06/19 20:05  102.4 F  62  20  114/55  93 L


 


 07/06/19 15:25  98.4 F  108 H  22  119/58  92 L


 


 07/06/19 15:24  98.4 F  108 H  22  119/58  92 L


 


 07/06/19 14:24      96


 


 07/06/19 13:05   96 H  18  141/73  98








 Current Medications











Generic Name Dose Route Start Last Admin





  Trade Name Freq  PRN Reason Stop Dose Admin


 


Acetaminophen  650 mg  07/06/19 14:25  07/07/19 00:10





  Tylenol 325 Mg***  PO  08/05/19 14:24  650 mg





  Q4H PRN PRN   Administration





  PAIN AND/OR FEVER   





     





     





     


 


Hydrocodone Bitart/Acetaminophen  1 tab  07/06/19 14:28  07/06/19 21:37





  Norco 7.5/325 Mg Tab***  PO  07/11/19 14:27  1 tab





  Q6H PRN PRN   Administration





  PAIN   





     





     





     


 


Aspirin  81 mg  07/07/19 10:00  07/07/19 09:17





  Ecotrin 81 Mg***  PO  08/06/19 09:59  81 mg





  QAM JAROCHO   Administration





     





     





     





     


 


Clonazepam  2 mg  07/06/19 16:00  





  Klonopin***  PO  08/05/19 15:59  





  TID PRN PRN   





     





     





     





     


 


Ergocalciferol  50,000 unit  07/08/19 10:00  





  Vitamin D2  PO  08/07/19 09:59  





  Mo@1000 JAROCHO   





     





     





     





     


 


Potassium Chloride/Sodium Chloride  1,000 mls @ 60 mls/hr  07/06/19 14:30  07/07 /19 09:14





  Sodium Chloride 0.9% W/ 20 Meq Kcl/Liter  IV  08/05/19 14:29  60 mls/hr





  .[id number] JAROCHO   Administration





     





     





     





     


 


Vancomycin HCl 2 gm/ Sodium  500 mls @ 250 mls/hr  07/07/19 16:00  





  Chloride  IV  08/06/19 15:59  





  Q24H JAROCHO   





     





     





     





     


 


Levothyroxine Sodium  125 mcg  07/07/19 10:00  07/07/19 09:18





  Synthroid 125 Mcg***  PO  08/06/19 09:59  125 mcg





  QAM JAROCHO   Administration





     





     





     





     


 


Magnesium Oxide  400 mg  07/07/19 10:00  07/07/19 09:18





  Mag-Ox 400***  PO  08/06/19 09:59  400 mg





  DAILY JAROCHO   Administration





     





     





     





     


 


Metoprolol Succinate  100 mg  07/07/19 10:00  07/07/19 09:29





  Toprol Xl 100 Mg***  PO  08/06/19 09:59  100 mg





  DAILY JAROCHO   Administration





     





     





     





     


 


Morphine Sulfate  4 mg  07/06/19 14:25  





  Morphine Sulfate 4 Mg Inj***  IV  07/11/19 14:24  





  Q4H PRN PRN   





  PAIN   





     





     





     


 


Ondansetron HCl  4 mg  07/06/19 14:25  





  Zofran 4 Mg/2 Ml Vial**  IV  08/05/19 14:24  





  Q6H PRN PRN   





  NAUSEA/VOMITING   





     





     





     


 


Potassium Chloride  30 meq  07/06/19 22:00  07/07/19 09:18





  Klor Con 10 Meq***  PO  08/05/19 21:59  30 meq





  BID JAROCHO   Administration





     





     





     





     


 


Promethazine HCl  25 mg  07/06/19 15:47  





  Phenergan 25 Mg***  PO  08/05/19 15:46  





  Q8H PRN PRN   





  NAUSEA   





     





     





     


 


Spironolactone  25 mg  07/07/19 10:00  07/07/19 09:17





  Aldactone 25 Mg***  PO  08/06/19 09:59  25 mg





  DAILY JAROCHO   Administration





     





     





     





     














Discontinued Medications














Generic Name Dose Route Start Last Admin





  Trade Name Freq  PRN Reason Stop Dose Admin


 


Enoxaparin Sodium  120 mg  07/06/19 13:18  07/06/19 13:37





  Enoxaparin Sodium  SQ  07/06/19 13:19  120 mg





  STAT STA   Administration





     





     





     





     


 


Enoxaparin Sodium  Confirm  07/06/19 13:35  





  Enoxaparin Sodium  Administered  07/06/19 13:36  





  Dose   





  120 mg   





  SQ   





  .STK-MED ONE   





     





     





     





     


 


Sodium Chloride  1,000 mls @ 100 mls/hr  07/06/19 12:30  07/06/19 13:04





  Sodium Chloride 0.9% 1000 Ml  IV  08/05/19 12:29  100 mls/hr





  .Q10H JAROCHO   Administration





     





     





     





     


 


Vancomycin HCl  1 gm in 250 mls @ 167 mls/hr  07/06/19 12:22  07/06/19 13:04





  Vancomycin 1gm/ Ns 250ml***  IV  07/06/19 13:51  167 mls/hr





  STAT ONE   Administration





     





     





     





     


 


Vancomycin HCl  Confirm  07/06/19 12:47  





  Vancomycin 1gm/ Ns 250ml***  Administered  07/06/19 12:48  





  Dose   





  250 mls @ ud   





  IV   





  .STK-MED ONE   





     





     





     





     


 


Sodium Chloride  1,000 mls @ 100 mls/hr  07/06/19 14:30  





  Sodium Chloride 0.9% 1000 Ml  IV  08/05/19 14:29  





  .Q10H JAROCHO   





     





     





     





     


 


Vancomycin HCl 1 gm/ Sodium  250 mls @ 167 mls/hr  07/06/19 14:30  07/06/19 16:

59





  Chloride  IV  08/05/19 14:29  167 mls/hr





  Q24H JAROCHO   Administration





     





     





     





     


 


Vancomycin HCl  1 gm in 250 mls @ 167 mls/hr  07/06/19 14:00  07/06/19 15:37





  Vancomycin 1gm/ Ns 250ml***  IV  07/06/19 15:29  167 mls/hr





  NOW ONE   Administration





     





     





     





     


 


Ondansetron HCl  4 mg  07/06/19 12:21  07/06/19 13:04





  Zofran 4 Mg/2 Ml Vial**  IV  07/06/19 12:22  4 mg





  STAT ONE   Administration





     





     





     





     


 


Ondansetron HCl  Confirm  07/06/19 12:46  





  Zofran 4 Mg/2 Ml Vial**  Administered  07/06/19 12:47  





  Dose   





  4 mg   





  .ROUTE   





  .STK-MED ONE   





     





     





     





     


 


Potassium Chloride  20 meq  07/06/19 22:00  





  Klor Con 10 Meq***  PO  08/05/19 21:59  





  BID JAROCHO   





     





     





     





     








 Intake & Output (Last 24 hours)











 07/05/19 07/06/19 07/07/19 07/08/19





 11:59 11:59 11:59 11:59


 


Intake Total   2109 


 


Output Total   1550 


 


Balance   559 


 


Weight  145.15 kg 158 kg 








 Microbiology Results (Last 24 hours)





07/06/19 13:05   Leg - Left Lower   Wound Culture - Preliminary


                            GRAM NEGATIVE ID AND SENSITIVITY PENDING


07/06/19 12:50   Blood   Blood Culture Gram Stain - Pending


07/06/19 12:50   Blood   Blood Culture - Pending


07/06/19 12:40   Blood   Blood Culture Gram Stain - Pending


07/06/19 12:40   Blood   Blood Culture - Pending


07/06/19 12:30   Leg - Left Lower   Anaerobic Culture - Pending





 Laboratory Results (Last 24 hours)











  07/07/19 07/06/19 07/06/19





  01:30 Unknown 22:15


 


WBC   


 


RBC   


 


Hgb   


 


Hct   


 


MCV   


 


MCH   


 


MCHC   


 


RDW   


 


Plt Count   


 


MPV   


 


Gran %   


 


Eos # (Auto)   


 


Absolute Lymphs (auto)   


 


Absolute Monos (auto)   


 


Lymphocytes %   


 


Monocytes %   


 


Eosinophils %   


 


Basophils %   


 


Absolute Granulocytes   


 


Basophils #   


 


PT   


 


INR   


 


D-Dimer   


 


Sodium   


 


Potassium   


 


Chloride   


 


Carbon Dioxide   


 


Anion Gap   


 


BUN   


 


Creatinine   


 


Estimated GFR   


 


Glucose   


 


Hemoglobin A1c   6.19 H 


 


Calcium   


 


Total Bilirubin   


 


AST   


 


ALT   


 


Alkaline Phosphatase   


 


Troponin I  < 0.012   < 0.012


 


Serum Total Protein   


 


Albumin   


 


Amylase   


 


Lipase   


 


Urine Color   


 


Urine Appearance   


 


Urine pH   


 


Ur Specific Gravity   


 


Urine Protein   


 


Urine Ketones   


 


Urine Blood   


 


Urine Nitrite   


 


Urine Bilirubin   


 


Urine Urobilinogen   


 


Ur Leukocyte Esterase   


 


Urine WBC (Auto)   


 


Urine RBC (Auto)   


 


U Epithel Cells (Auto)   


 


Urine Bacteria (Auto)   


 


Urine Mucus (Auto)   


 


Urine Culture Reflexed   


 


Urine Glucose   














  07/06/19 07/06/19 07/06/19





  19:20 16:09 14:01


 


WBC   


 


RBC   


 


Hgb   


 


Hct   


 


MCV   


 


MCH   


 


MCHC   


 


RDW   


 


Plt Count   


 


MPV   


 


Gran %   


 


Eos # (Auto)   


 


Absolute Lymphs (auto)   


 


Absolute Monos (auto)   


 


Lymphocytes %   


 


Monocytes %   


 


Eosinophils %   


 


Basophils %   


 


Absolute Granulocytes   


 


Basophils #   


 


PT   


 


INR   


 


D-Dimer   


 


Sodium   


 


Potassium   


 


Chloride   


 


Carbon Dioxide   


 


Anion Gap   


 


BUN   


 


Creatinine   


 


Estimated GFR   


 


Glucose   


 


Hemoglobin A1c   


 


Calcium   


 


Total Bilirubin   


 


AST   


 


ALT   


 


Alkaline Phosphatase   


 


Troponin I  < 0.012  < 0.012 


 


Serum Total Protein   


 


Albumin   


 


Amylase   


 


Lipase   


 


Urine Color    STRAW


 


Urine Appearance    CLEAR


 


Urine pH    7.0


 


Ur Specific Gravity    1.006


 


Urine Protein    NEGATIVE


 


Urine Ketones    NEGATIVE


 


Urine Blood    NEGATIVE


 


Urine Nitrite    NEGATIVE


 


Urine Bilirubin    NEGATIVE


 


Urine Urobilinogen    NEGATIVE


 


Ur Leukocyte Esterase    NEGATIVE


 


Urine WBC (Auto)    0-2


 


Urine RBC (Auto)    NONE


 


U Epithel Cells (Auto)    NONE


 


Urine Bacteria (Auto)    NONE


 


Urine Mucus (Auto)    SLIGHT


 


Urine Culture Reflexed    NO


 


Urine Glucose    NEGATIVE














  07/06/19 07/06/19 07/06/19





  12:40 12:40 12:40


 


WBC   


 


RBC   


 


Hgb   


 


Hct   


 


MCV   


 


MCH   


 


MCHC   


 


RDW   


 


Plt Count   


 


MPV   


 


Gran %   


 


Eos # (Auto)   


 


Absolute Lymphs (auto)   


 


Absolute Monos (auto)   


 


Lymphocytes %   


 


Monocytes %   


 


Eosinophils %   


 


Basophils %   


 


Absolute Granulocytes   


 


Basophils #   


 


PT   12.1 


 


INR   1.07 


 


D-Dimer    665 H*


 


Sodium   


 


Potassium   


 


Chloride   


 


Carbon Dioxide   


 


Anion Gap   


 


BUN   


 


Creatinine   


 


Estimated GFR   


 


Glucose   


 


Hemoglobin A1c   


 


Calcium   


 


Total Bilirubin   


 


AST   


 


ALT   


 


Alkaline Phosphatase   


 


Troponin I  < 0.012  


 


Serum Total Protein   


 


Albumin   


 


Amylase   


 


Lipase   


 


Urine Color   


 


Urine Appearance   


 


Urine pH   


 


Ur Specific Gravity   


 


Urine Protein   


 


Urine Ketones   


 


Urine Blood   


 


Urine Nitrite   


 


Urine Bilirubin   


 


Urine Urobilinogen   


 


Ur Leukocyte Esterase   


 


Urine WBC (Auto)   


 


Urine RBC (Auto)   


 


U Epithel Cells (Auto)   


 


Urine Bacteria (Auto)   


 


Urine Mucus (Auto)   


 


Urine Culture Reflexed   


 


Urine Glucose   














  07/06/19 07/06/19





  12:40 12:40


 


WBC   21.1 H


 


RBC   4.66


 


Hgb   14.2


 


Hct   44.8


 


MCV   96.1


 


MCH   30.5


 


MCHC   31.7 L


 


RDW   14.3 H


 


Plt Count   207


 


MPV   8.5


 


Gran %   91.8 H


 


Eos # (Auto)   0.05


 


Absolute Lymphs (auto)   0.89 L


 


Absolute Monos (auto)   0.77


 


Lymphocytes %   4.2 L


 


Monocytes %   3.7


 


Eosinophils %   0.2


 


Basophils %   0.1


 


Absolute Granulocytes   19.36 H


 


Basophils #   0.02


 


PT  


 


INR  


 


D-Dimer  


 


Sodium  141 


 


Potassium  3.7 


 


Chloride  99 


 


Carbon Dioxide  32 H 


 


Anion Gap  13.7 


 


BUN  20 H 


 


Creatinine  1.52 H 


 


Estimated GFR  37.6 


 


Glucose  137 H 


 


Hemoglobin A1c  


 


Calcium  9.4 


 


Total Bilirubin  0.70 


 


AST  26 


 


ALT  27 


 


Alkaline Phosphatase  116 


 


Troponin I  


 


Serum Total Protein  7.5 


 


Albumin  4.2 


 


Amylase  75 


 


Lipase  112 


 


Urine Color  


 


Urine Appearance  


 


Urine pH  


 


Ur Specific Gravity  


 


Urine Protein  


 


Urine Ketones  


 


Urine Blood  


 


Urine Nitrite  


 


Urine Bilirubin  


 


Urine Urobilinogen  


 


Ur Leukocyte Esterase  


 


Urine WBC (Auto)  


 


Urine RBC (Auto)  


 


U Epithel Cells (Auto)  


 


Urine Bacteria (Auto)  


 


Urine Mucus (Auto)  


 


Urine Culture Reflexed  


 


Urine Glucose  








 Orders (Last 24 hours)











 Category Date Time Status


 


 Up Ad Mary Jo ROUTINE Activity  07/06/19 14:26 Active


 


 ACCUCHECK [Accucheck] ACHS Care  07/06/19 20:25 Active


 


 Accucheck Q4H Care  07/06/19 14:25 Completed


 


 Call Admit Doctor for Orders ON ADMISSION Care  07/06/19 14:25 Active


 


 Code Status Order ROUTINE Care  07/06/19 14:25 Active


 


 IV Insertion STAT Care  07/06/19 13:07 Completed


 


 Place in Observation ROUTINE Care  07/06/19 14:25 Active


 


 Regular Diet Diet  07/06/19 Dinner Active


 


 CHEST 1 VIEW (PORTABLE) Stat Exams  07/06/19 13:08 Completed


 


 VENOUS UNILAT/LIMITED EXTREMIT [US] Stat Exams  07/06/19 14:17 Completed


 


 AMYLASE Stat Lab  07/06/19 12:40 Completed


 


 Anaerobic Culture with Smear [MR] Stat Lab  07/06/19 12:30 Received


 


 BLOOD CULTURE Stat Lab  07/06/19 12:50 Received


 


 BMP Routine Lab  07/07/19 11:32 Ordered


 


 CBC Routine Lab  07/07/19 11:32 Ordered


 


 CBC W DIFF Stat Lab  07/06/19 12:40 Completed


 


 CMP Stat Lab  07/06/19 12:40 Completed


 


 CULTURE,WOUND Stat Lab  07/06/19 13:05 Results


 


 D-DIMER QUANTITATION Stat Lab  07/06/19 12:40 Completed


 


 LIPASE Stat Lab  07/06/19 12:40 Completed


 


 Lactic Acid Stat Lab  07/06/19 12:18 Completed


 


 PROTIME WITH INR Stat Lab  07/06/19 12:40 Completed


 


 TROPONIN Q3H Lab  07/06/19 12:40 Completed


 


 TROPONIN Q3H Lab  07/06/19 16:09 Completed


 


 TROPONIN Q3H Lab  07/06/19 19:20 Completed


 


 TROPONIN Q3H Lab  07/06/19 22:15 Completed


 


 TROPONIN Q3H Lab  07/07/19 01:30 Completed


 


 UA W/RFX UR CULTURE Stat Lab  07/06/19 14:01 Completed


 


 Vancomycin, Trough Routine Lab  07/08/19 15:30 Ordered


 


 Acetaminophen 325 mg*** [Tylenol 325 mg***] Med  07/06/19 14:25 Active





 650 mg PO Q4H PRN PRN   


 


 Aspirin EC 81 mg*** [Ecotrin 81 mg***] Med  07/07/19 10:00 Active





 81 mg PO QAM   


 


 Clonazepam*** [Klonopin***] Med  07/06/19 16:00 Active





 2 mg PO TID PRN PRN   


 


 Enoxaparin Sodium*** [Enoxaparin Sodium] Med  07/06/19 13:35 Discontinued





 120 mg SQ .STK-MED ONE   


 


 Enoxaparin Sodium*** [Enoxaparin Sodium] Med  07/06/19 13:18 Discontinued





 120 mg SQ STAT STA   


 


 Ergocalciferol (Vitamin D2) [Vitamin D2] Med  07/08/19 10:00 Active





 50,000 unit PO Mo@1000   


 


 Hydrocodone /APAP 7.5/325 mg** [Norco 7.5/325 mg Tab*** Med  07/06/19 14:28 

Active





 ]   





 1 tab PO Q6H PRN PRN   


 


 Levothyroxine Sodium 125 Mcg** [Synthroid 125 Mcg***] Med  07/07/19 10:00 

Active





 125 mcg PO QAM   


 


 Magnesium Oxide 400 mg*** [Mag-Ox 400***] Med  07/07/19 10:00 Active





 400 mg PO DAILY   


 


 Metoprolol Succinate 100 mg*** [Toprol Xl 100 MG***] Med  07/07/19 10:00 Active





 100 mg PO DAILY   


 


 Morphine Sulfate 4 mg Inj*** Med  07/06/19 14:25 Active





 4 mg IV Q4H PRN PRN   


 


 NaCl 0.9% 1000 ml + KCl 20 Meq [Sodium Chloride 0.9% W/ Med  07/06/19 14:30 

Active





 20 mEq KCl/LITER] 1,000 ml   





 IV 60 mls/hr   


 


 NaCl 0.9% 1000 ml [Sodium Chloride 0.9% 1000 ML] 1,000 Med  07/06/19 12:30 

Discontinued





 ml   





  mls/hr   


 


 NaCl 0.9% 1000 ml [Sodium Chloride 0.9% 1000 ML] 1,000 Med  07/06/19 14:30 

Discontinued





 ml   





  mls/hr   


 


 Ondansetron HCl 4 mg/2 ml** [Zofran 4 MG/2 ML VIAL**] Med  07/06/19 12:46 

Discontinued





 4 mg .ROUTE .STK-MED ONE   


 


 Ondansetron HCl 4 mg/2 ml** [Zofran 4 MG/2 ML VIAL**] Med  07/06/19 14:25 

Active





 4 mg IV Q6H PRN PRN   


 


 Ondansetron HCl 4 mg/2 ml** [Zofran 4 MG/2 ML VIAL**] Med  07/06/19 12:21 

Discontinued





 4 mg IV STAT ONE   


 


 Potassium Chloride 10 Meq Tab* [Klor Con 10 MEQ***] Med  07/06/19 22:00 

Discontinued





 20 meq PO BID   


 


 Potassium Chloride 10 Meq Tab* [Klor Con 10 MEQ***] Med  07/06/19 22:00 Active





 30 meq PO BID   


 


 Promethazine HCl 25 mg*** [Phenergan 25 mg***] Med  07/06/19 15:47 Active





 25 mg PO Q8H PRN PRN   


 


 Spironolactone 25 mg*** [Aldactone 25 MG***] Med  07/07/19 10:00 Active





 25 mg PO DAILY   


 


 Vancomycin HCl 1 gm Inj*** [Vancocin 1 gm Vial***] 1 gm Med  07/06/19 14:30 

Discontinued





 NaCl 0.9% 250 ml [Sodium Chloride 0.9% 250 ML] 250 ml   





 IV Q24H   


 


 Vancomycin HCl 1 gm Inj*** [Vancocin 1 gm Vial***] 2 gm Med  07/07/19 16:00 

Active





 NaCl 0.9% 500 ml [Sodium Chloride 0.9% 500 ML] 500 ml   





 IV Q24H   


 


 Vancomycin HCl 1 gm Premix*** [Vancomycin 1GM/ Ns 250ML Med  07/06/19 14:00 

Discontinued





 ***]   





 1 gm in 250 ml IV NOW   


 


 Vancomycin HCl 1 gm Premix*** [Vancomycin 1GM/ Ns 250ML Med  07/06/19 12:22 

Discontinued





 ***]   





 1 gm in 250 ml IV STAT   


 


 Vancomycin HCl 1 gm Premix*** [Vancomycin 1GM/ Ns 250ML Med  07/06/19 12:47 

Discontinued





 ***] 250 ml   





 IV UD   


 


 OT Screen per Nursing Assess OT  07/06/19 15:42 Active


 


 PT Screen per Nursing Assess ONCE PT  07/06/19 15:42 Active


 


 Oxygen Nasal Cannula 2 lpm RT  07/07/19 01:43 Active








 Patient Care Notes (Last 24 hours)





07/06/19 15:54 Pharmacy Note by Ty Mejia


Pharmacokinetic dosing service    


Date:   7/6/19        Time: 1600





====================


Objective:      TROUGH 10-15


====================


Patient: NATIVIDAD AGUERO  Floor: 107      Age: 53 yo


Serum creatinine:  1.52 mg/dL     Height: 69 Inches     Weight (kg): 145





Diagnosis:   CELLULITIS


Relevant medical/social history:


Cultures and sensitivities:


Other labs:


====================


Assessment:


====================


IBW (kg): 66.20     Dosing wt(kg): 145     


Estimated Creatinine clearance (ml/min): 43.2        CRCL method: Cockcroft and 

Gault using ibw(default).





Drug selected: Vancomycin    Loading dose (mg):   0 


Vd (liters): 116.0   (factor used: 0.8 L/kg)  Edson (hr-1): 0.040     Half life (

hrs):  17.33 





Recommended dose:  2000 mg     Interval: 24 hrs     Infusion time (hrs): 2.0


Predicted peak (mcg/mL):  26.9      Predicted trough  (mcg/mL): 11.16


    Total body weight is being used for vancomycin dosing. 





Renal function is stable [ ] /unstable [ ????]





====================


Recommendations:


====================


Give Vancomycin  2000 mg  q 24 hrs with an expected Cpeak of 26.9 mcg/ml and an 

expected Ctrough of 





11.16 mcg/ml





Renal dosing of other antibiotics (review renal dosing of other medications and 

list guidelines here):





Thank you for the consult, will continue to follow.





Signature:   IRVIN MEJIA





 ** Electronically signed by Ty Mejia on 07/06/19 15:55 **


Initialized on 07/06/19 15:54 - END OF NOTE

















- Vitals & Intake/Output


Vital Signs: 


 Vital Signs











Temperature  98.4 F   07/07/19 11:21


 


Pulse Rate  64   07/07/19 11:21


 


Respiratory Rate  20   07/07/19 11:21


 


Blood Pressure  120/58   07/07/19 11:21


 


O2 Sat by Pulse Oximetry  92 L  07/07/19 11:21








 Oxygen-Last Documented











O2 Percentage                  2 Liters = 28%














Intake & Output: 


 Intake & Output











 07/05/19 07/06/19 07/07/19 07/08/19





 11:59 11:59 11:59 11:59


 


Intake Total   2109 


 


Output Total   1550 


 


Balance   559 


 


Weight  145.15 kg 158 kg 














- Lab


Result Diagrams: 


 07/06/19 12:40





 07/06/19 12:40


Lab Results-Last 24 Hrs: 


 Accuchecks











Date                           07/07/19


 


Date                           07/07/19


 


Date                           07/06/19


 


Date                           07/06/19


 


Time                           11:30


 


Time                           07:30


 


Time                           22:00


 


Time                           16:00


 


Accucheck Value:               231


 


Accucheck Value:               136


 


Accucheck Value:               143


 


Accucheck Value:               119











 Lab Results-Last 24 Hours











  07/06/19 07/06/19 07/06/19 Range/Units





  12:40 12:40 12:40 


 


WBC  21.1 H    (4.0-10.5)  K/mm3


 


RBC  4.66    (4.1-5.4)  M/mm3


 


Hgb  14.2    (12.0-16.0)  gm/dl


 


Hct  44.8    (35-47)  %


 


MCV  96.1    ()  fl


 


MCH  30.5    (26-32)  pg


 


MCHC  31.7 L    (32-36)  g/dl


 


RDW  14.3 H    (11.5-14.0)  %


 


Plt Count  207    (150-450)  K/mm3


 


MPV  8.5    (6-9.5)  fl


 


Gran %  91.8 H    (36.0-66.0)  %


 


Eos # (Auto)  0.05    (0-0.5)  


 


Absolute Lymphs (auto)  0.89 L    (1.0-4.6)  


 


Absolute Monos (auto)  0.77    (0.0-1.3)  


 


Lymphocytes %  4.2 L    (24.0-44.0)  %


 


Monocytes %  3.7    (0.0-12.0)  %


 


Eosinophils %  0.2    (0.00-5.0)  %


 


Basophils %  0.1    (0.0-0.4)  %


 


Absolute Granulocytes  19.36 H    (1.4-6.9)  


 


Basophils #  0.02    (0-0.4)  


 


PT     (9.95-12.35)  SECONDS


 


INR     (0.8-3.0)  


 


D-Dimer    665 H*  (215-500)  ng/mL


 


Sodium   141   (137-145)  mmol/L


 


Potassium   3.7   (3.5-5.1)  mmol/L


 


Chloride   99   ()  mmol/L


 


Carbon Dioxide   32 H   (22-30)  mmol/L


 


Anion Gap   13.7   (5-15)  MEQ/L


 


BUN   20 H   (7-17)  mg/dL


 


Creatinine   1.52 H   (0.52-1.04)  mg/dL


 


Estimated GFR   37.6   ML/MIN


 


Glucose   137 H   ()  mg/dL


 


Hemoglobin A1c     (4.5-6.0)  %


 


Calcium   9.4   (8.4-10.2)  mg/dL


 


Total Bilirubin   0.70   (0.2-1.3)  mg/dL


 


AST   26   (14-36)  U/L


 


ALT   27   (0-35)  U/L


 


Alkaline Phosphatase   116   ()  U/L


 


Troponin I     (0.000-0.034)  ng/mL


 


Serum Total Protein   7.5   (6.3-8.2)  g/dL


 


Albumin   4.2   (3.5-5.0)  g/dL


 


Amylase   75   ()  U/L


 


Lipase   112   ()  U/L


 


Urine Color     (YELLOW)  


 


Urine Appearance     (CLEAR)  


 


Urine pH     (5-6)  


 


Ur Specific Gravity     (1.005-1.025)  


 


Urine Protein     (Negative)  


 


Urine Ketones     (NEGATIVE)  


 


Urine Blood     (0-5)  Dougie/ul


 


Urine Nitrite     (NEGATIVE)  


 


Urine Bilirubin     (NEGATIVE)  


 


Urine Urobilinogen     (0-1)  mg/dL


 


Ur Leukocyte Esterase     (NEGATIVE)  


 


Urine WBC (Auto)     (0-5)  /HPF


 


Urine RBC (Auto)     (0-2)  /HPF


 


U Epithel Cells (Auto)     (FEW)  /HPF


 


Urine Bacteria (Auto)     (NEGATIVE)  /HPF


 


Urine Mucus (Auto)     (NEGATIVE)  /HPF


 


Urine Culture Reflexed     (NO)  


 


Urine Glucose     (NEGATIVE)  mg/dL














  07/06/19 07/06/19 07/06/19 Range/Units





  12:40 12:40 14:01 


 


WBC     (4.0-10.5)  K/mm3


 


RBC     (4.1-5.4)  M/mm3


 


Hgb     (12.0-16.0)  gm/dl


 


Hct     (35-47)  %


 


MCV     ()  fl


 


MCH     (26-32)  pg


 


MCHC     (32-36)  g/dl


 


RDW     (11.5-14.0)  %


 


Plt Count     (150-450)  K/mm3


 


MPV     (6-9.5)  fl


 


Gran %     (36.0-66.0)  %


 


Eos # (Auto)     (0-0.5)  


 


Absolute Lymphs (auto)     (1.0-4.6)  


 


Absolute Monos (auto)     (0.0-1.3)  


 


Lymphocytes %     (24.0-44.0)  %


 


Monocytes %     (0.0-12.0)  %


 


Eosinophils %     (0.00-5.0)  %


 


Basophils %     (0.0-0.4)  %


 


Absolute Granulocytes     (1.4-6.9)  


 


Basophils #     (0-0.4)  


 


PT  12.1    (9.95-12.35)  SECONDS


 


INR  1.07    (0.8-3.0)  


 


D-Dimer     (215-500)  ng/mL


 


Sodium     (137-145)  mmol/L


 


Potassium     (3.5-5.1)  mmol/L


 


Chloride     ()  mmol/L


 


Carbon Dioxide     (22-30)  mmol/L


 


Anion Gap     (5-15)  MEQ/L


 


BUN     (7-17)  mg/dL


 


Creatinine     (0.52-1.04)  mg/dL


 


Estimated GFR     ML/MIN


 


Glucose     ()  mg/dL


 


Hemoglobin A1c     (4.5-6.0)  %


 


Calcium     (8.4-10.2)  mg/dL


 


Total Bilirubin     (0.2-1.3)  mg/dL


 


AST     (14-36)  U/L


 


ALT     (0-35)  U/L


 


Alkaline Phosphatase     ()  U/L


 


Troponin I   < 0.012   (0.000-0.034)  ng/mL


 


Serum Total Protein     (6.3-8.2)  g/dL


 


Albumin     (3.5-5.0)  g/dL


 


Amylase     ()  U/L


 


Lipase     ()  U/L


 


Urine Color    STRAW  (YELLOW)  


 


Urine Appearance    CLEAR  (CLEAR)  


 


Urine pH    7.0  (5-6)  


 


Ur Specific Gravity    1.006  (1.005-1.025)  


 


Urine Protein    NEGATIVE  (Negative)  


 


Urine Ketones    NEGATIVE  (NEGATIVE)  


 


Urine Blood    NEGATIVE  (0-5)  Dougie/ul


 


Urine Nitrite    NEGATIVE  (NEGATIVE)  


 


Urine Bilirubin    NEGATIVE  (NEGATIVE)  


 


Urine Urobilinogen    NEGATIVE  (0-1)  mg/dL


 


Ur Leukocyte Esterase    NEGATIVE  (NEGATIVE)  


 


Urine WBC (Auto)    0-2  (0-5)  /HPF


 


Urine RBC (Auto)    NONE  (0-2)  /HPF


 


U Epithel Cells (Auto)    NONE  (FEW)  /HPF


 


Urine Bacteria (Auto)    NONE  (NEGATIVE)  /HPF


 


Urine Mucus (Auto)    SLIGHT  (NEGATIVE)  /HPF


 


Urine Culture Reflexed    NO  (NO)  


 


Urine Glucose    NEGATIVE  (NEGATIVE)  mg/dL














  07/06/19 07/06/19 07/06/19 Range/Units





  16:09 19:20 22:15 


 


WBC     (4.0-10.5)  K/mm3


 


RBC     (4.1-5.4)  M/mm3


 


Hgb     (12.0-16.0)  gm/dl


 


Hct     (35-47)  %


 


MCV     ()  fl


 


MCH     (26-32)  pg


 


MCHC     (32-36)  g/dl


 


RDW     (11.5-14.0)  %


 


Plt Count     (150-450)  K/mm3


 


MPV     (6-9.5)  fl


 


Gran %     (36.0-66.0)  %


 


Eos # (Auto)     (0-0.5)  


 


Absolute Lymphs (auto)     (1.0-4.6)  


 


Absolute Monos (auto)     (0.0-1.3)  


 


Lymphocytes %     (24.0-44.0)  %


 


Monocytes %     (0.0-12.0)  %


 


Eosinophils %     (0.00-5.0)  %


 


Basophils %     (0.0-0.4)  %


 


Absolute Granulocytes     (1.4-6.9)  


 


Basophils #     (0-0.4)  


 


PT     (9.95-12.35)  SECONDS


 


INR     (0.8-3.0)  


 


D-Dimer     (215-500)  ng/mL


 


Sodium     (137-145)  mmol/L


 


Potassium     (3.5-5.1)  mmol/L


 


Chloride     ()  mmol/L


 


Carbon Dioxide     (22-30)  mmol/L


 


Anion Gap     (5-15)  MEQ/L


 


BUN     (7-17)  mg/dL


 


Creatinine     (0.52-1.04)  mg/dL


 


Estimated GFR     ML/MIN


 


Glucose     ()  mg/dL


 


Hemoglobin A1c     (4.5-6.0)  %


 


Calcium     (8.4-10.2)  mg/dL


 


Total Bilirubin     (0.2-1.3)  mg/dL


 


AST     (14-36)  U/L


 


ALT     (0-35)  U/L


 


Alkaline Phosphatase     ()  U/L


 


Troponin I  < 0.012  < 0.012  < 0.012  (0.000-0.034)  ng/mL


 


Serum Total Protein     (6.3-8.2)  g/dL


 


Albumin     (3.5-5.0)  g/dL


 


Amylase     ()  U/L


 


Lipase     ()  U/L


 


Urine Color     (YELLOW)  


 


Urine Appearance     (CLEAR)  


 


Urine pH     (5-6)  


 


Ur Specific Gravity     (1.005-1.025)  


 


Urine Protein     (Negative)  


 


Urine Ketones     (NEGATIVE)  


 


Urine Blood     (0-5)  Dougie/ul


 


Urine Nitrite     (NEGATIVE)  


 


Urine Bilirubin     (NEGATIVE)  


 


Urine Urobilinogen     (0-1)  mg/dL


 


Ur Leukocyte Esterase     (NEGATIVE)  


 


Urine WBC (Auto)     (0-5)  /HPF


 


Urine RBC (Auto)     (0-2)  /HPF


 


U Epithel Cells (Auto)     (FEW)  /HPF


 


Urine Bacteria (Auto)     (NEGATIVE)  /HPF


 


Urine Mucus (Auto)     (NEGATIVE)  /HPF


 


Urine Culture Reflexed     (NO)  


 


Urine Glucose     (NEGATIVE)  mg/dL














  07/06/19 07/07/19 Range/Units





  Unknown 01:30 


 


WBC    (4.0-10.5)  K/mm3


 


RBC    (4.1-5.4)  M/mm3


 


Hgb    (12.0-16.0)  gm/dl


 


Hct    (35-47)  %


 


MCV    ()  fl


 


MCH    (26-32)  pg


 


MCHC    (32-36)  g/dl


 


RDW    (11.5-14.0)  %


 


Plt Count    (150-450)  K/mm3


 


MPV    (6-9.5)  fl


 


Gran %    (36.0-66.0)  %


 


Eos # (Auto)    (0-0.5)  


 


Absolute Lymphs (auto)    (1.0-4.6)  


 


Absolute Monos (auto)    (0.0-1.3)  


 


Lymphocytes %    (24.0-44.0)  %


 


Monocytes %    (0.0-12.0)  %


 


Eosinophils %    (0.00-5.0)  %


 


Basophils %    (0.0-0.4)  %


 


Absolute Granulocytes    (1.4-6.9)  


 


Basophils #    (0-0.4)  


 


PT    (9.95-12.35)  SECONDS


 


INR    (0.8-3.0)  


 


D-Dimer    (215-500)  ng/mL


 


Sodium    (137-145)  mmol/L


 


Potassium    (3.5-5.1)  mmol/L


 


Chloride    ()  mmol/L


 


Carbon Dioxide    (22-30)  mmol/L


 


Anion Gap    (5-15)  MEQ/L


 


BUN    (7-17)  mg/dL


 


Creatinine    (0.52-1.04)  mg/dL


 


Estimated GFR    ML/MIN


 


Glucose    ()  mg/dL


 


Hemoglobin A1c  6.19 H   (4.5-6.0)  %


 


Calcium    (8.4-10.2)  mg/dL


 


Total Bilirubin    (0.2-1.3)  mg/dL


 


AST    (14-36)  U/L


 


ALT    (0-35)  U/L


 


Alkaline Phosphatase    ()  U/L


 


Troponin I   < 0.012  (0.000-0.034)  ng/mL


 


Serum Total Protein    (6.3-8.2)  g/dL


 


Albumin    (3.5-5.0)  g/dL


 


Amylase    ()  U/L


 


Lipase    ()  U/L


 


Urine Color    (YELLOW)  


 


Urine Appearance    (CLEAR)  


 


Urine pH    (5-6)  


 


Ur Specific Gravity    (1.005-1.025)  


 


Urine Protein    (Negative)  


 


Urine Ketones    (NEGATIVE)  


 


Urine Blood    (0-5)  Dougie/ul


 


Urine Nitrite    (NEGATIVE)  


 


Urine Bilirubin    (NEGATIVE)  


 


Urine Urobilinogen    (0-1)  mg/dL


 


Ur Leukocyte Esterase    (NEGATIVE)  


 


Urine WBC (Auto)    (0-5)  /HPF


 


Urine RBC (Auto)    (0-2)  /HPF


 


U Epithel Cells (Auto)    (FEW)  /HPF


 


Urine Bacteria (Auto)    (NEGATIVE)  /HPF


 


Urine Mucus (Auto)    (NEGATIVE)  /HPF


 


Urine Culture Reflexed    (NO)  


 


Urine Glucose    (NEGATIVE)  mg/dL











Micro Results-Entire Visit: 


 Microbiology











 07/06/19 13:05 Wound Culture - Preliminary





 Leg - Left Lower    GRAM NEGATIVE ID AND SENSITIVITY PENDING








 Accuchecks











Date                           07/07/19


 


Date                           07/07/19


 


Date                           07/06/19


 


Date                           07/06/19


 


Time                           11:30


 


Time                           07:30


 


Time                           22:00


 


Time                           16:00


 


Accucheck Value:               231


 


Accucheck Value:               136


 


Accucheck Value:               143


 


Accucheck Value:               119

















- Radiology Exams


Ordered Rad Exams-Entire Visit: 


 Radiology Procedures











 Category Date Time Status


 


 CHEST 1 VIEW (PORTABLE) Stat Exams  07/06/19 13:08 Completed


 


 VENOUS UNILAT/LIMITED EXTREMIT [US] Stat Exams  07/06/19 14:17 Completed














- Procedures and Test


Procedures and Tests throughout Hospitalization: 


 Therapy Orders & Screens





07/06/19 15:42


OT Screen per Nursing Assess 


   Comment: Protocol Order


   Physician Instructions: Greater than 3 points order OT Admission Screening


   Reason For Exam: Triggered on Admission


   Diagnosis: LLL cellulitis.


   Open Wound/Cellutlitis/Pressure Ulcers: Yes


   Acute Fx/ORIF/Change in wt bearing status: No


   Severe MUSCULOSKELETAL pain: No


   ADL Dysfunction: No


   Acute CVA w/Hemiparesis/Hemiplegia: No


   Decreased Functional Mobility/Strength: No


   Sprain/Strain: No


   Acute Post-op Mobility Dysfunction: No


   Total Points: 5


PT Screen per Nursing Assess ONCE 


   Comment: Protocol Order


   Physician Instructions: Greater than 3 points order PT Admission Screenin


   Reason For Exam: Triggered on Admission


   Diagnosis: LLL cellulitis.


   Open Wound/Cellutlitis/Pressure Ulcers: Yes


   Acute Fx/ORIF/Change in wt bearing status: No


   Severe MUSCULOSKELETAL pain: No


   ADL Dysfunction: No


   Acute CVA w/Hemiparesis/Hemiplegia: No


   Decreased Functional Mobility/Strength: No


   Sprain/Strain: No


   Acute Post-op Mobility Dysfunction: No


   Total Points: 5





07/07/19 01:43


Oxygen Nasal Cannula 2 lpm 


   Comment: 


   Diagnosis: LLL cellulitis.














- Discharge


Discharge Date: 07/07/19


Disposition: Home, Self-Care


Condition: Stable


Prescriptions: 


New


   Levofloxacin [Levaquin] 500 mg PO DAILY #7 tablet





Continue


   clonazePAM [Klonopin] 2 mg PO TID PRN


   Aspirin 81 gm Chew*** [Baby Aspirin 81 mg Chew***] 81 mg PO DAILY


   Hydrocodone Bit/Acetaminophen [Norco 7.5-325 Tablet] 1 tab PO Q6HPRN PRN


     PRN Reason: Pain


   Spironolactone 25 mg*** [Aldactone 25 MG***] 25 mg PO DAILY


   Promethazine HCl 25 mg*** [Phenergan 25 mg***] 25 mg PO Q8H PRN PRN #10 

tablet


     PRN Reason: Nausea


   Magnesium Oxide 400 mg PO DAILY


   Ergocalciferol (Vitamin D2) [Vitamin D2] 50,000 unit PO Q7D


   Levothyroxine Sodium [Synthroid] 125 mcg PO DAILY


   Potassium Chloride 10 Meq Tab* [Klor Con 10 MEQ***] 30 meq PO BID


   Metoprolol Succinate 100 mg PO DAILY


Follow up with: 


MICHELLE VILLANUEVA [Primary Care Provider] - 1 Week

## 2023-01-20 ENCOUNTER — HOSPITAL ENCOUNTER (EMERGENCY)
Dept: HOSPITAL 33 - ED | Age: 58
Discharge: HOME | End: 2023-01-20
Payer: COMMERCIAL

## 2023-01-20 VITALS — OXYGEN SATURATION: 98 %

## 2023-01-20 VITALS — SYSTOLIC BLOOD PRESSURE: 94 MMHG | HEART RATE: 81 BPM | DIASTOLIC BLOOD PRESSURE: 60 MMHG

## 2023-01-20 DIAGNOSIS — I12.9: ICD-10-CM

## 2023-01-20 DIAGNOSIS — E11.22: ICD-10-CM

## 2023-01-20 DIAGNOSIS — Z79.52: ICD-10-CM

## 2023-01-20 DIAGNOSIS — M79.674: ICD-10-CM

## 2023-01-20 DIAGNOSIS — Z79.891: ICD-10-CM

## 2023-01-20 DIAGNOSIS — N18.2: ICD-10-CM

## 2023-01-20 DIAGNOSIS — M10.9: Primary | ICD-10-CM

## 2023-01-20 PROCEDURE — 36415 COLL VENOUS BLD VENIPUNCTURE: CPT

## 2023-01-20 PROCEDURE — 73630 X-RAY EXAM OF FOOT: CPT

## 2023-01-20 PROCEDURE — 99283 EMERGENCY DEPT VISIT LOW MDM: CPT

## 2023-01-20 PROCEDURE — 84550 ASSAY OF BLOOD/URIC ACID: CPT

## 2023-01-20 NOTE — XRAY
Indication: Pain and swelling.



Comparison: None



3 nonweightbearing views right foot demonstrates mild diffuse anterior soft

tissue swelling, osteopenia, tiny posterior/small heel spurs, small

curvilinear cuboid accessory ossicle, and minimal lower leg vascular

calcifications.  No other bony, articular, or soft tissue abnormalities.

## 2023-01-20 NOTE — ERPHSYRPT
- History of Present Illness


Time Seen by Provider: 01/20/23 08:48


Source: patient


Exam Limitations: no limitations


Patient Subjective Stated Complaint: Pt states "I woke up with pain in my right 

foot in the toe joint and it just keeps getting worse and worse."


Triage Nursing Assessment: Pt presented alert and oriented X 3, skin pwd. Pt 

unable to put weight on her right foot, swelling and tenderness noted to right 

foot near great toe.


Physician History: 





This is a 57-year-old diabetic white female patient of nurse practitioner Alexi 

who woke up yesterday and when she stepped out of bed she felt a pain or mild 

snapping sensation right great toe.  Throughout the day yesterday and this 

morning pain has increased in this area.  Patient has no prior history of any 

gout.  Patient has a history of hypothyroidism, hypertension, COPD and chronic 

renal disease.


Method of Injury: unknown


Occurred: yesterday


Quality: aching, other (Worse when ambulating)


Severity of Pain-Max: mild (To moderate)


Severity of Pain-Current: mild


Lower Extremities Pain: foot: right, 1st toe: right


Modifying Factors: Improves With: movement


Associated Symptoms: snapping sensation (Region of right great toe), other 

(Hurts to bear weight)


Allergies/Adverse Reactions: 








clindamycin Allergy (Severe, Verified 01/20/23 08:44)


   Swelling


doxycycline Allergy (Severe, Verified 01/20/23 08:44)


   mouth breaks out in sores


cephalexin [From Keflex] Allergy (Verified 12/20/20 12:40)


   


Penicillins Allergy (Verified 12/20/20 12:40)


   


sulfamethoxazole [From Bactrim] Allergy (Verified 12/20/20 12:40)


   


trimethoprim [From Bactrim] Allergy (Verified 12/20/20 12:40)


   





Home Medications: 








Aspirin 81 gm Chew*** [Baby Aspirin 81 mg Chew***] 81 mg PO DAILY 11/22/13 

[History]


clonazePAM [Klonopin] 2 mg PO TID PRN 11/22/13 [History]


Spironolactone 25 mg*** [Aldactone 25 MG***] 25 mg PO DAILY 05/06/17 [History]


Magnesium Oxide 400 mg PO DAILY 03/29/18 [History]


Ergocalciferol (Vitamin D2) [Vitamin D2] 50,000 unit PO Q7D 06/18/18 [History]


Levothyroxine Sodium [Synthroid] 125 mcg PO DAILY 06/18/18 [History]


Metoprolol Succinate 100 mg PO DAILY 06/18/18 [History]


Potassium Chloride Tab* [Klor Con] 30 meq PO BID 06/18/18 [History]





Hx Tetanus, Diphtheria Vaccination/Date Given:  (last tetanus unknown)


Hx Influenza Vaccination/Date Given: No


Hx Pneumococcal Vaccination/Date Given: No


Immunizations Up to Date: Yes





Travel Risk





- International Travel


Have you traveled outside of the country in past 3 weeks: No





- Coronavirus Screening


Are you exhibiting any of the following symptoms?: No


Close contact with a COVID-19 positive Pt in past 14-21 Days: No





- Vaccine Status


Have you recieved a Covid-19 vaccination: Yes


: Pfizer





- Vaccination Dates


Date of 2cond Vaccination (if applicable): 2021





- Review of Systems


Constitutional: No Symptoms


Eyes: No Symptoms


Ears, Nose, & Throat: No Symptoms


Respiratory: No Symptoms


Cardiac: No Symptoms


Abdominal/Gastrointestinal: No Symptoms


Genitourinary Symptoms: No Symptoms


Musculoskeletal: Joint Pain (Right great toe)


Skin: No Symptoms


Neurological: No Symptoms


Psychological: No Symptoms


Endocrine: No Symptoms


Hematologic/Lymphatic: No Symptoms


Immunological/Allergic: No Symptoms


All Other Systems: Reviewed and Negative





- Past Medical History


Pertinent Past Medical History: Yes


Neurological History: No Pertinent History


ENT History: No Pertinent History


Cardiac History: Hypertension


Respiratory History: Asthma, COPD


Endocrine Medical History: Hypothyroidism, Other


Musculoskeletal History: Fractures, Osteoarthritis


GI Medical History: No Pertinent History


 History: Renal Disease


Psycho-Social History: No Pertinent History


Female Reproductive Disorders: No Pertinent History


Other Medical History: STAGE 2 CKD - PATIENT REPORTS 30# WEIGHT LOSS RECENTLY 

AND HAS IMPROVED FROM STAGE 3 TO STAGE 2.  HX FX RIGHT "KNEE" 2010 AND STATES 

HAD SURGERY TO CLEAN IN OUT. AMBULATES WITH CANE,COVID+





- Past Surgical History


Past Surgical History: Yes


Neuro Surgical History: No Pertinent History


Cardiac: No Pertinent History


Respiratory: No Pertinent History


Gastrointestinal: Appendectomy, Cholecystectomy


Genitourinary: No Pertinent History


Musculoskeletal: Orthopedic Surgery


Female Surgical History: Hysterectomy


Other Surgical History: right knee





- Social History


Smoking Status: Unknown if ever smoked


Exposure to second hand smoke: No


Drug Use: none


Patient Lives Alone: No





- Nursing Vital Signs


Nursing Vital Signs: 


                               Initial Vital Signs











Temperature  97.8 F   01/20/23 08:37


 


Pulse Rate  90   01/20/23 08:37


 


Respiratory Rate  20   01/20/23 08:37


 


Blood Pressure  93/73   01/20/23 08:37


 


O2 Sat by Pulse Oximetry  98   01/20/23 08:37








                                   Pain Scale











Pain Intensity                 8

















- Physical Exam


General Appearance: no apparent distress, alert, anxiety


Eyes, Ears, Nose, Throat Exam: normal ENT inspection, moist mucous membranes


Neck Exam: normal inspection, non-tender, supple, full range of motion


Cardiovascular/Respiratory Exam: chest non-tender, no respiratory distress


Gastrointestinal/Abdominal Exam: non-tender


Back Exam: normal inspection, normal range of motion, No CVA tenderness, No 

vertebral tenderness


Hips Exam: bilateral: non-tender, normal inspection, normal range of motion, no 

evidence of injury


Legs Exam: bilateral leg: non-tender, normal inspection, normal range of motion,

 no evidence of injury


Knees Exam: bilateral knee: non-tender, normal inspection, normal range of 

motion, no evidence of injury


Ankle Exam: bilateral ankle: non-tender, normal inspection, normal range of 

motion, no evidence of injury


Foot Exam: right foot: bone tenderness (Great toe), soft tissue tenderness 

(Great toe), swelling (Great toe), left foot: non-tender, normal inspection, 

bilateral foot: normal range of motion, no evidence of injury


Neuro/Tendon Exam: normal sensation, normal motor functions, normal tendon 

functions, responds to pain, no evidence tendon injury


Mental Status Exam: alert, oriented x 3, cooperative


Skin Exam: other (Skin of bilateral lower extremities dry)


**SpO2 Interpretation**: normal


SpO2: 98


O2 Delivery: Room Air





- Course


Nursing assessment & vital signs reviewed: Yes


Ordered Tests: 


                               Active Orders 24 hr











 Category Date Time Status


 


 FOOT (MINIMUM 3 VIEWS) Stat Exams  01/20/23 08:49 Completed


 


 Uric Acid Stat Lab  01/20/23 09:27 Completed











Lab/Rad Data: 


                               Laboratory Results











  01/20/23 Range/Units





  09:27 


 


Uric Acid  8.4 H  (2.6-6.0)  mg/dL














- Progress


Progress: unchanged


Progress Note: 





01/20/23 09:30


X-ray right foot shows mild diffuse anterior soft tissue swelling.  There is 

tiny heel spur present.  There is no acute fracture or dislocation.


01/20/23 09:31


Medical decision making: This patient appears to have gout.  I will avoid NSAIDs

 given the patient's chronic renal disease.  I will give her a short dose of 

oral steroids and Percocet pain medication.  Patient will follow up with her 

primary care physician to discuss medications to help avoid recurrence.


Counseled pt/family regarding: lab results, diagnosis, need for follow-up, rad 

results





- Departure


Departure Disposition: Home


Clinical Impression: 


 Acute gout





Condition: Stable


Critical Care Time: No


Referrals: 


ALEJANDRA FERRIS NP [Primary Care Provider] - Follow up/PCP as directed


Additional Instructions: 


Elevate the right leg above the level of heart when not up and ambulating.  Take

your medication as prescribed.  Monitor your blood sugar closely while taking 

the steroids.  Follow-up with your nephrologist and your primary care provider 

for further evaluation management of your gout.


Prescriptions: 


Oxycodone HCl/Acetaminophen [Percocet 5-325 mg Tablet] 1 each PO Q8H PRN PRN #6 

tablet MDD 3


 PRN Reason: Moderate To Severe Pain


Prednisone 5 mg*** [Deltasone 5 mg***] 5 mg PO TID #12 tablet

## 2024-12-04 ENCOUNTER — HOSPITAL ENCOUNTER (EMERGENCY)
Dept: HOSPITAL 33 - ED | Age: 59
Discharge: HOME | End: 2024-12-04
Payer: COMMERCIAL

## 2024-12-04 VITALS — HEART RATE: 72 BPM

## 2024-12-04 VITALS — SYSTOLIC BLOOD PRESSURE: 86 MMHG | RESPIRATION RATE: 14 BRPM | DIASTOLIC BLOOD PRESSURE: 56 MMHG

## 2024-12-04 VITALS — TEMPERATURE: 97.4 F | OXYGEN SATURATION: 98 %

## 2024-12-04 DIAGNOSIS — K42.9: ICD-10-CM

## 2024-12-04 DIAGNOSIS — R10.32: Primary | ICD-10-CM

## 2024-12-04 LAB
ALBUMIN SERPL-MCNC: 4 G/DL (ref 3.5–5)
ALP SERPL-CCNC: 84 U/L (ref 38–126)
ALT SERPL-CCNC: 29 U/L (ref 0–35)
ANION GAP SERPL CALC-SCNC: 8.3 MEQ/L (ref 5–15)
AST SERPL QL: 38 U/L (ref 14–36)
BASOPHILS # BLD AUTO: 0.02 X10^3/UL (ref 0.01–0.08)
BASOPHILS NFR BLD AUTO: 0.3 % (ref 0.1–1.2)
BILIRUB BLD-MCNC: 0.5 MG/DL (ref 0.2–1.3)
BUN SERPL-MCNC: 23 MG/DL (ref 7–17)
CALCIUM SPEC-MCNC: 9.2 MG/DL (ref 8.4–10.2)
CHLORIDE SERPL-SCNC: 103 MMOL/L (ref 98–107)
CO2 SERPL-SCNC: 32 MMOL/L (ref 22–30)
CREAT SERPL-MCNC: 1.85 MG/DL (ref 0.52–1.04)
EOSINOPHIL # BLD AUTO: 0.19 X10^3/UL (ref 0.04–0.36)
GFR SERPLBLD BASED ON 1.73 SQ M-ARVRAT: 30.6 ML/MIN
GLUCOSE SERPL-MCNC: 78 MG/DL (ref 74–106)
HCT VFR BLD AUTO: 36 % (ref 34.1–44.9)
HGB BLD-MCNC: 11.6 G/DL (ref 11.2–15.7)
IMM GRANULOCYTES # BLD: 0.02 X10^3U/L (ref 0–0.03)
IMM GRANULOCYTES NFR BLD: 0.3 % (ref 0–0.43)
LIPASE SERPL-CCNC: 205 U/L (ref 23–300)
LYMPHOCYTES # SPEC AUTO: 1.4 X10^3/UL (ref 1.18–3.74)
MCH RBC QN AUTO: 30.9 PG (ref 25.6–32.2)
MCHC RBC AUTO-ENTMCNC: 32.2 G/DL (ref 32.2–35.5)
MONOCYTES # BLD AUTO: 0.38 X10^3/UL (ref 0.24–0.86)
NRBC # BLD AUTO: 0 X10^3U/L (ref 0–0.01)
NRBC BLD AUTO-RTO: 0 % (ref 0–0.2)
PLATELET # BLD AUTO: 227 X10^3/UL (ref 182–369)
POTASSIUM SERPLBLD-SCNC: 4.4 MMOL/L (ref 3.5–5.1)
PROT SERPL-MCNC: 6.9 G/DL (ref 6.3–8.2)
RBC # BLD AUTO: 3.76 X10^6/UL (ref 3.93–5.22)
RBC # URNS HPF: (no result) /HPF (ref 0–5)
SODIUM SERPL-SCNC: 139 MMOL/L (ref 135–145)
WBC # BLD AUTO: 5.9 X10^3/UL (ref 3.98–10.04)
WBC URNS QL MICRO: (no result) /HPF (ref 0–5)

## 2024-12-04 PROCEDURE — 81001 URINALYSIS AUTO W/SCOPE: CPT

## 2024-12-04 PROCEDURE — 74176 CT ABD & PELVIS W/O CONTRAST: CPT

## 2024-12-04 PROCEDURE — 36415 COLL VENOUS BLD VENIPUNCTURE: CPT

## 2024-12-04 PROCEDURE — 99284 EMERGENCY DEPT VISIT MOD MDM: CPT

## 2024-12-04 PROCEDURE — 85025 COMPLETE CBC W/AUTO DIFF WBC: CPT

## 2024-12-04 PROCEDURE — 80053 COMPREHEN METABOLIC PANEL: CPT

## 2024-12-04 PROCEDURE — 96360 HYDRATION IV INFUSION INIT: CPT

## 2024-12-04 PROCEDURE — 83690 ASSAY OF LIPASE: CPT

## 2024-12-04 NOTE — ERPHSYRPT
- History of Present Illness


Time Seen by Provider: 12/04/24 10:29


Historian: patient


Exam Limitations: no limitations


Patient Subjective Stated Complaint: Back/Flank pain


Triage Nursing Assessment: Patient ambulated back to ED with cane and transf

erred self to bed. Patient A+O X 3. Patient's skin pink, warm and dry. Patient 

complains of left side flank/back pain 10/10 that is going into left side of 

abdomen. Patient states the pain started on Sunday and is getting worse. Patient

denies N/V or diarrhea. Abdomen soft and round with BS X 4. Patient states she 

is currently on Cipro for UTI and has 1 day left.


Physician History: 





59-year-old female with history of hypertension, hyperlipidemia, diabetes 

mellitus, kidney stones, CKD presented in the ER with complaint of left flank 

pain since yesterday, dull aching to sharp, nonradiating, moderate to severe 

intensity, aggravated with palpation and movements.  Denies any associated 

urinary symptoms.  No nausea or vomiting.  Denies any history of back pain.  No 

fever or chills reported.


Allergies/Adverse Reactions: 








clindamycin Allergy (Severe, Verified 12/04/24 11:00)


   Swelling


doxycycline Allergy (Severe, Verified 12/04/24 11:00)


   mouth breaks out in sores


cephalexin [From Keflex] Allergy (Verified 12/04/24 11:00)


   


Penicillins Allergy (Verified 12/04/24 11:00)


   


sulfamethoxazole [From Bactrim] Allergy (Verified 12/04/24 11:00)


   


trimethoprim [From Bactrim] Allergy (Verified 12/04/24 11:00)


   





Home Medications: 








Aspirin 81 gm Chew*** [Baby Aspirin 81 mg Chew***] 81 mg PO DAILY 11/22/13 

[History]


clonazePAM [Klonopin] 2 mg PO TID PRN 11/22/13 [History]


Spironolactone 25 mg*** [Aldactone 25 MG***] 25 mg PO DAILY 05/06/17 [History]


Magnesium Oxide 400 mg PO DAILY 03/29/18 [History]


Ergocalciferol (Vitamin D2) [Vitamin D2] 50,000 unit PO Q7D 06/18/18 [History]


Levothyroxine Sodium [Synthroid] 125 mcg PO DAILY 06/18/18 [History]


Metoprolol Succinate 100 mg PO DAILY 06/18/18 [History]


Potassium Chloride Tab* [Klor Con] 30 meq PO BID 06/18/18 [History]





Hx Tetanus, Diphtheria Vaccination/Date Given:  (last tetanus unknown)


Hx Influenza Vaccination/Date Given: Yes


Hx Pneumococcal Vaccination/Date Given: Yes


Immunizations Up to Date: Yes





Travel Risk





- International Travel


Have you traveled outside of the country in past 3 weeks: No





- Emerging Infectious Disease


Are you exhibiting symptoms associated with any current EIDs: No





- Review of Systems


Constitutional: No Symptoms


Ears, Nose, & Throat: No Symptoms


Respiratory: No Symptoms


Cardiac: No Symptoms


Abdominal/Gastrointestinal: Abdominal Pain


Genitourinary Symptoms: No Symptoms


Musculoskeletal: No Symptoms


Skin: No Symptoms


Neurological: No Symptoms


Psychological: No Symptoms


Endocrine: No Symptoms


Hematologic/Lymphatic: No Symptoms





- Past Medical History


Pertinent Past Medical History: Yes


Neurological History: No Pertinent History


ENT History: No Pertinent History


Cardiac History: Hypertension


Respiratory History: Asthma, COPD


Endocrine Medical History: Hypothyroidism, Other


Musculoskeletal History: Fractures, Osteoarthritis


GI Medical History: No Pertinent History


 History: Renal Disease


Psycho-Social History: No Pertinent History


Female Reproductive Disorders: No Pertinent History


Other Medical History: STAGE 2 CKD - PATIENT REPORTS 30# WEIGHT LOSS RECENTLY 

AND HAS IMPROVED FROM STAGE 3 TO STAGE 2.  HX FX RIGHT "KNEE" 2010 AND STATES 

HAD SURGERY TO CLEAN IN OUT. AMBULATES WITH CANE,COVID+





- Past Surgical History


Past Surgical History: Yes


Neuro Surgical History: No Pertinent History


Cardiac: No Pertinent History


Respiratory: No Pertinent History


Gastrointestinal: Appendectomy, Cholecystectomy


Genitourinary: No Pertinent History


Musculoskeletal: Orthopedic Surgery


Female Surgical History: Hysterectomy


Other Surgical History: right knee





- Social History


Smoking Status: Unknown if ever smoked


Exposure to second hand smoke: No


Drug Use: none


Patient Lives Alone: No





- Social Determinants of Health


Will the patient participate in the screening: Yes


Do you worry about a steady place to live?: No


Do you have any problems with any of the following?: No known problems


In the past 12 months,have you had to go without utilities?: No


Transportation Issues: No


Has anyone in your support network made you feel unsafe?: No


Have you or anyone in your house had to go without enough: No





- Nursing Vital Signs


Nursing Vital Signs: 


                               Initial Vital Signs











Pulse Rate  70   12/04/24 11:01


 


Respiratory Rate  16   12/04/24 11:01


 


Blood Pressure  91/59   12/04/24 11:01


 


O2 Sat by Pulse Oximetry  98   12/04/24 11:01








                                   Pain Scale











Pain Intensity                 10

















- Physical Exam


General Appearance: no apparent distress


Eye Exam: PERRL/EOMI


Ears, Nose, Throat Exam: normal ENT inspection


Neck Exam: normal inspection, full range of motion


Respiratory Exam: normal breath sounds, lungs clear


Cardiovascular Exam: regular rate/rhythm, normal heart sounds


Gastrointestinal/Abdomen Exam: soft, normal bowel sounds, tenderness


Back Exam: normal inspection, normal range of motion, CVA tenderness, No 

vertebral tenderness


Extremity Exam: normal inspection, normal range of motion


Neurologic Exam: alert, oriented x 3


Skin Exam: normal color


**SpO2 Interpretation**: normal


SpO2: 98


O2 Delivery: Room Air


Ordered Tests: 


                               Active Orders 24 hr











 Category Date Time Status


 


 IV Insertion STAT Care  12/04/24 11:14 Active


 


 NPO (ED) STAT Care  12/04/24 11:14 Active


 


 ABDOMEN AND PELVIS W/0 CONTRAS [CT] Stat Exams  12/04/24 11:15 Completed


 


 CBC W DIFF Stat Lab  12/04/24 11:19 Completed


 


 CMP Stat Lab  12/04/24 11:19 Completed


 


 LIPASE Stat Lab  12/04/24 11:19 Completed


 


 UA W/RFX UR CULTURE Stat Lab  12/04/24 11:19 Completed








Medication Summary














Discontinued Medications














Generic Name Dose Route Start Last Admin





  Trade Name Carlita  PRN Reason Stop Dose Admin


 


Sodium Chloride  1,000 mls @ 999 mls/hr  12/04/24 11:14  12/04/24 12:23





  Sodium Chloride 0.9% 1000 Ml  IV  12/04/24 12:14  Infused





  .Q1H1M STA   Infusion


 


Sodium Chloride  Confirm  12/04/24 11:21 





  Sodium Chloride 0.9% 1000 Ml  Administered  12/04/24 11:22 





  Dose  





  1,000 mls @ ud  





  .ROUTE  





  .STK-MED ONE  











Lab/Rad Data: 


                           Laboratory Result Diagrams





                                 12/04/24 11:19 





                                 12/04/24 11:19 





                               Laboratory Results











  12/04/24 12/04/24 12/04/24 Range/Units





  11:19 11:19 11:19 


 


WBC   5.9   (3.98-10.04)  x10^3/uL


 


RBC   3.76 L   (3.93-5.22)  x10^6/uL


 


Hgb   11.6   (11.2-15.7)  g/dL


 


Hct   36.0   (34.1-44.9)  %


 


MCV   95.7 H   (79.4-94.8)  fL


 


MCH   30.9   (25.6-32.2)  pg


 


MCHC   32.2   (32.2-35.5)  g/dL


 


RDW   12.6   (11.7-14.4)  %


 


Plt Count   227   (182-369)  x10^3/uL


 


MPV   8.7 L   (9.4-12.3)  fL


 


Gran %   66.2   (34.0-71.1)  %


 


Immature Gran % (Auto)   0.3   (0.001-0.429)  %


 


Nucleat RBC Rel Count   0.0   (0.00-0.2)  %


 


Eos # (Auto)   0.19   (0.04-0.36)  x10^3/uL


 


Immature Gran # (Auto)   0.02   (0.001-0.031)  x10^3u/L


 


Absolute Lymphs (auto)   1.40   (1.18-3.74)  x10^3/uL


 


Absolute Monos (auto)   0.38   (0.24-0.86)  x10^3/uL


 


Absolute Nucleated RBC   0.00   (0.00-0.012)  x10^3u/L


 


Lymphocytes %   23.6   (19.3-51.7)  %


 


Monocytes %   6.4   (4.7-12.5)  %


 


Eosinophils %   3.2   (0.7-5.8)  %


 


Basophils %   0.3   (0.1-1.2)  %


 


Absolute Granulocytes   3.93   (1.56-6.13)  x10^3/uL


 


Basophils #   0.02   (0.01-0.08)  x10^3/uL


 


Sodium  139    (135-145)  mmol/L


 


Potassium  4.4    (3.5-5.1)  mmol/L


 


Chloride  103    ()  mmol/L


 


Carbon Dioxide  32 H    (22-30)  mmol/L


 


Anion Gap  8.3    (5-15)  MEQ/L


 


BUN  23 H    (7-17)  mg/dL


 


Creatinine  1.85 H    (0.52-1.04)  mg/dL


 


Estimated GFR  30.6    ML/MIN


 


Glucose  78    ()  mg/dL


 


Calcium  9.2    (8.4-10.2)  mg/dL


 


Total Bilirubin  0.50    (0.2-1.3)  mg/dL


 


AST  38 H    (14-36)  U/L


 


ALT  29    (0-35)  U/L


 


Alkaline Phosphatase  84    ()  U/L


 


Serum Total Protein  6.9    (6.3-8.2)  g/dL


 


Albumin  4.0    (3.5-5.0)  g/dL


 


Lipase  205    ()  U/L


 


Urine Color    Yellow  (Yellow)  


 


Urine Appearance    Clear  (Clear)  


 


Urine pH    7.0  (4.6-8.0)  


 


Ur Specific Gravity    1.015  (1.005-1.030)  


 


Urine Protein    Negative  (Negative)  


 


Urine Glucose (UA)    Negative  (Negative)  mg/dL


 


Urine Ketones    Negative  (Negative)  


 


Urine Blood    Negative  (Negative)  


 


Urine Nitrite    Negative  (Negative)  


 


Urine Bilirubin    Negative  (Negative)  


 


Urine Urobilinogen    0.2  (0.2)  mg/dL


 


Ur Leukocyte Esterase    Small A  (Negative)  


 


U Hyaline Cast (Auto)    NONE SEEN  (0-2)  /LPF


 


Urine Microscopic RBC    0-2  (0-5)  /HPF


 


Urine Microscopic WBC    0-2  (0-5)  /HPF


 


Ur Epithelial Cells    Rare  (None Seen)  /HPF


 


Urine Bacteria    None Seen  (None Seen)  /HPF


 


Urine Culture Reflexed    NO  (NO)  














- Progress


Progress: improved


Progress Note: 





12/04/24 13:35


59-year-old with hypertension, hyperlipidemia, CKD, kidney stones is evaluated 

in the ER for left flank pain.  She is given symptomatic treatment, on 

reevaluation she is feeling much improved.  Workup showed normal white count, 

chemistries with stable CKD with a creatinine of 1.8.  No definite UTI, just 

finished course of Cipro.  Patient CT abdomen pelvis is negative for any acute 

intra-abdominal pelvic findings, does have supraumbilical hernia, chronic 

changes in the spine but no acute findings suggesting her symptomatology.  No 

peritoneal signs on repeated evaluations.  I think either pain is more of a 

musculoskeletal from back.  Patient is advised to have symptomatic treatment as 

I have ruled out all the major emergencies.  Recommended outpatient follow-up.  

Discussed signs symptoms of worsening needing return to ER which she seems 

understanding.  Stable for discharge.








Counseled pt/family regarding: lab results, diagnosis, need for follow-up, rad r

esults





Medical Desision Making





- Diagnostic Testing


Diagnostic test were ordered, analyzed, and reviewed by me: Yes


Radiological Interpretation: Reviewed by me





- Risk of complications


The pt has a mod risk of morbidity or mortality based on: Need for prescription 

drug management





- Departure


Departure Disposition: Home


Clinical Impression: 


 Flank pain, Back pain





Condition: Stable


Critical Care Time: No


Referrals: 


ALEJANDRA FERRIS NP [Primary Care Provider] - Follow up with PCP 1 day


Instructions:  Low Back Pain  (DC)


Additional Instructions: 


Take pain medications as needed.  Follow-up with your primary care for 

reevaluation.  Return to ER for intractable pain, difficulty urination or if 

develop fever chills etc.


Prescriptions: 


Hydrocodone/Acetaminophen [Hydrocodone-Acetamin 5-325 mg***] 1 tab PO Q8HPRN PRN

3 Days #8 tablet MDD 4


 PRN Reason: Pain

## 2024-12-04 NOTE — XRAY
CLINICAL HISTORY: left flank pain

COMPARISON: No prior studies are available for comparison.

TECHNIQUE: Non-contrast CT of the abdomen and pelvis was performed, with the

following protocol: axial images, and reconstructed coronal and sagittal

images. One of the following dose reduction techniques was utilized for this

exam: Automated exposure control, adjustment of the mA and/or kV according to

patient size, and use of iterative reconstruction.

FINDINGS:

Abdomen:

 Liver:

 Liver is enlarged, measuring 17.5 cm in the craniocaudal axis.

 No focal lesions, cysts, or masses were identified.



 Gallbladder and Biliary System:

 Gall bladder is absent. Cholecystectomy clips are seen in situ.

 No abnormal biliary dilatation.



 Pancreas:

 Pancreatic head, body, and tail are visualized and appear normal in size and

density.

 No pancreatic masses or calcifications were noted.



 Spleen:

 Normal in size, shape, and density.

 No splenic lesions or masses were identified.



 Kidneys and Adrenal Glands:

 Both kidneys are normal in size, shape, and position.

 Cortical thickness is within normal limits.

 No renal calculi or hydronephrosis.

 Adrenal glands are unremarkable.



 Appendix:

 Surgically removed.



 Pelvis:

 Urinary Bladder is suboptimaly filled.

 No intraluminal calculi or lesions.



Uterus: Not visualized.

 No gross adnexal mass or cyst.



 Peritoneal and Retroperitoneal Structures:

 No free fluid or abnormal fluid collections were identified within the

abdomen or pelvis.

 No lymphadenopathy was noted.



 Bowel:

 The visualized bowel loops are normal in caliber and appearance.

No evidence of bowel obstruction or wall thickening.

 A 17 mm supraumbilical hernia is seen with herniation of omental fat.



 Bones and Soft Tissues:

 Visualized lumbar spine show multilevel osteophytosis, end plate

irregularities, vacuum phenomenon, and facet joint arthropathy.

 Mild scoliotic deformity towards the right.

 No fractures or abnormal masses were identified.

 Few phleboliths are seen in pelvis.



 Slices through lung bases are clear.

IMPRESSION:

Hepatomegaly.

 No radiodensity in the line of KUB.

 Supraumbilical hernia.

 Lumbar spondylosis.



 Clinical correlation is recommended for further evaluation.



_____________________________________

Electronically Signed by: Efrain Mendoza MD. (12/04/2024 12:21:56 EST)

## 2025-01-08 ENCOUNTER — HOSPITAL ENCOUNTER (OUTPATIENT)
Dept: HOSPITAL 33 - SDC-PAIN | Age: 60
Discharge: HOME | End: 2025-01-08
Attending: PSYCHIATRY & NEUROLOGY
Payer: COMMERCIAL

## 2025-01-08 DIAGNOSIS — E11.9: ICD-10-CM

## 2025-01-08 DIAGNOSIS — M46.1: Primary | ICD-10-CM

## 2025-01-08 PROCEDURE — 72170 X-RAY EXAM OF PELVIS: CPT

## 2025-01-08 PROCEDURE — 82947 ASSAY GLUCOSE BLOOD QUANT: CPT

## 2025-01-08 PROCEDURE — 77002 NEEDLE LOCALIZATION BY XRAY: CPT

## 2025-01-08 PROCEDURE — 27096 INJECT SACROILIAC JOINT: CPT

## 2025-01-08 NOTE — XRAY
Indication: Left SI joint injection.



Intraoperative fluoroscopy provided for 15 seconds.  3 digital spot images

submitted for interpretation demonstrates posterior needle tip projecting over

left SI joint.  Small amount of contrast injected for needle tip placement.

Correlate with intraoperative findings/report.

## 2025-02-27 ENCOUNTER — HOSPITAL ENCOUNTER (OUTPATIENT)
Dept: HOSPITAL 33 - SDC-PAIN | Age: 60
Discharge: HOME | End: 2025-02-27
Attending: PSYCHIATRY & NEUROLOGY
Payer: COMMERCIAL

## 2025-02-27 DIAGNOSIS — M47.816: Primary | ICD-10-CM

## 2025-02-27 DIAGNOSIS — E11.9: ICD-10-CM

## 2025-02-27 PROCEDURE — 64493 INJ PARAVERT F JNT L/S 1 LEV: CPT

## 2025-02-27 PROCEDURE — 77002 NEEDLE LOCALIZATION BY XRAY: CPT

## 2025-02-27 PROCEDURE — 64494 INJ PARAVERT F JNT L/S 2 LEV: CPT

## 2025-02-27 PROCEDURE — 82947 ASSAY GLUCOSE BLOOD QUANT: CPT

## 2025-02-27 PROCEDURE — 72020 X-RAY EXAM OF SPINE 1 VIEW: CPT

## 2025-02-27 NOTE — XRAY
Indication: Bilateral L4-S1 MBB.



Intraoperative fluoroscopy provided for 13 seconds.  Single digital spot image

submitted for interpretation demonstrates posterior needle tips projecting

over expected left and right L4-S1 nerve roots.  Correlate with intraoperative

findings/report.

## 2025-03-19 ENCOUNTER — HOSPITAL ENCOUNTER (OUTPATIENT)
Dept: HOSPITAL 33 - SDC-PAIN | Age: 60
Discharge: HOME | End: 2025-03-19
Attending: PSYCHIATRY & NEUROLOGY
Payer: COMMERCIAL

## 2025-03-19 DIAGNOSIS — Z53.8: Primary | ICD-10-CM

## 2025-03-19 DIAGNOSIS — E11.9: ICD-10-CM

## 2025-03-19 PROCEDURE — 82947 ASSAY GLUCOSE BLOOD QUANT: CPT

## 2025-03-26 ENCOUNTER — HOSPITAL ENCOUNTER (OUTPATIENT)
Dept: HOSPITAL 33 - SDC-PAIN | Age: 60
Discharge: HOME | End: 2025-03-26
Attending: PSYCHIATRY & NEUROLOGY
Payer: COMMERCIAL

## 2025-03-26 DIAGNOSIS — E11.9: ICD-10-CM

## 2025-03-26 DIAGNOSIS — M47.816: Primary | ICD-10-CM

## 2025-03-26 PROCEDURE — 72020 X-RAY EXAM OF SPINE 1 VIEW: CPT

## 2025-03-26 PROCEDURE — 64493 INJ PARAVERT F JNT L/S 1 LEV: CPT

## 2025-03-26 PROCEDURE — 64494 INJ PARAVERT F JNT L/S 2 LEV: CPT

## 2025-03-26 PROCEDURE — 82947 ASSAY GLUCOSE BLOOD QUANT: CPT

## 2025-03-26 NOTE — XRAY
Indication: Bilateral L4-S1 MBB.



Intraoperative fluoroscopy provided for 16 seconds.  Single digital spot image

submitted for interpretation demonstrates posterior needle tips project over

expected left and right L4-S1 nerve roots.  Correlate with intraoperative

findings/report.

## 2025-04-16 ENCOUNTER — HOSPITAL ENCOUNTER (OUTPATIENT)
Dept: HOSPITAL 33 - SDC-PAIN | Age: 60
Discharge: HOME | End: 2025-04-16
Attending: PSYCHIATRY & NEUROLOGY
Payer: COMMERCIAL

## 2025-04-16 DIAGNOSIS — M47.817: Primary | ICD-10-CM

## 2025-04-16 DIAGNOSIS — E11.9: ICD-10-CM

## 2025-04-16 PROCEDURE — 72100 X-RAY EXAM L-S SPINE 2/3 VWS: CPT

## 2025-04-16 PROCEDURE — 64636 DESTROY L/S FACET JNT ADDL: CPT

## 2025-04-16 PROCEDURE — 82947 ASSAY GLUCOSE BLOOD QUANT: CPT

## 2025-04-16 PROCEDURE — 64635 DESTROY LUMB/SAC FACET JNT: CPT

## 2025-04-17 ENCOUNTER — HOSPITAL ENCOUNTER (OUTPATIENT)
Dept: HOSPITAL 33 - SDC-PAIN | Age: 60
Discharge: HOME | End: 2025-04-17
Attending: PSYCHIATRY & NEUROLOGY
Payer: COMMERCIAL

## 2025-04-17 DIAGNOSIS — M47.817: Primary | ICD-10-CM

## 2025-04-17 DIAGNOSIS — E11.9: ICD-10-CM

## 2025-04-17 PROCEDURE — 82947 ASSAY GLUCOSE BLOOD QUANT: CPT

## 2025-04-17 PROCEDURE — 64636 DESTROY L/S FACET JNT ADDL: CPT

## 2025-04-17 PROCEDURE — 64635 DESTROY LUMB/SAC FACET JNT: CPT

## 2025-04-17 PROCEDURE — 72100 X-RAY EXAM L-S SPINE 2/3 VWS: CPT
